# Patient Record
Sex: MALE | Race: WHITE | NOT HISPANIC OR LATINO
[De-identification: names, ages, dates, MRNs, and addresses within clinical notes are randomized per-mention and may not be internally consistent; named-entity substitution may affect disease eponyms.]

---

## 2017-12-05 PROBLEM — Z00.00 ENCOUNTER FOR PREVENTIVE HEALTH EXAMINATION: Status: ACTIVE | Noted: 2017-12-05

## 2017-12-14 ENCOUNTER — APPOINTMENT (OUTPATIENT)
Dept: ORTHOPEDIC SURGERY | Facility: CLINIC | Age: 57
End: 2017-12-14
Payer: OTHER MISCELLANEOUS

## 2017-12-14 VITALS
HEIGHT: 74 IN | WEIGHT: 250 LBS | HEART RATE: 85 BPM | SYSTOLIC BLOOD PRESSURE: 149 MMHG | DIASTOLIC BLOOD PRESSURE: 82 MMHG | BODY MASS INDEX: 32.08 KG/M2

## 2017-12-14 PROCEDURE — 72100 X-RAY EXAM L-S SPINE 2/3 VWS: CPT

## 2017-12-14 PROCEDURE — 72040 X-RAY EXAM NECK SPINE 2-3 VW: CPT

## 2017-12-14 PROCEDURE — 99205 OFFICE O/P NEW HI 60 MIN: CPT

## 2017-12-14 RX ORDER — OXYCODONE AND ACETAMINOPHEN 7.5; 325 MG/1; MG/1
7.5-325 TABLET ORAL
Qty: 40 | Refills: 0 | Status: ACTIVE | COMMUNITY
Start: 2017-06-22

## 2017-12-14 RX ORDER — AMOXICILLIN AND CLAVULANATE POTASSIUM 875; 125 MG/1; MG/1
875-125 TABLET, COATED ORAL
Qty: 14 | Refills: 0 | Status: ACTIVE | COMMUNITY
Start: 2017-09-20

## 2017-12-14 RX ORDER — FLUTICASONE PROPIONATE 50 UG/1
50 SPRAY, METERED NASAL
Qty: 16 | Refills: 0 | Status: ACTIVE | COMMUNITY
Start: 2017-09-20

## 2018-01-18 ENCOUNTER — OUTPATIENT (OUTPATIENT)
Dept: OUTPATIENT SERVICES | Facility: HOSPITAL | Age: 58
LOS: 1 days | End: 2018-01-18
Payer: COMMERCIAL

## 2018-01-18 VITALS
HEART RATE: 83 BPM | WEIGHT: 251.99 LBS | HEIGHT: 74 IN | OXYGEN SATURATION: 98 % | TEMPERATURE: 98 F | SYSTOLIC BLOOD PRESSURE: 152 MMHG | RESPIRATION RATE: 16 BRPM | DIASTOLIC BLOOD PRESSURE: 81 MMHG

## 2018-01-18 DIAGNOSIS — Z98.890 OTHER SPECIFIED POSTPROCEDURAL STATES: Chronic | ICD-10-CM

## 2018-01-18 DIAGNOSIS — M96.0 PSEUDARTHROSIS AFTER FUSION OR ARTHRODESIS: ICD-10-CM

## 2018-01-18 DIAGNOSIS — Z01.818 ENCOUNTER FOR OTHER PREPROCEDURAL EXAMINATION: ICD-10-CM

## 2018-01-18 DIAGNOSIS — M43.22 FUSION OF SPINE, CERVICAL REGION: Chronic | ICD-10-CM

## 2018-01-18 DIAGNOSIS — M54.2 CERVICALGIA: ICD-10-CM

## 2018-01-18 LAB
ANION GAP SERPL CALC-SCNC: 28 MMOL/L — HIGH (ref 5–17)
BLD GP AB SCN SERPL QL: NEGATIVE — SIGNIFICANT CHANGE UP
BUN SERPL-MCNC: 17 MG/DL — SIGNIFICANT CHANGE UP (ref 7–23)
CALCIUM SERPL-MCNC: 10.9 MG/DL — HIGH (ref 8.4–10.5)
CHLORIDE SERPL-SCNC: 104 MMOL/L — SIGNIFICANT CHANGE UP (ref 96–108)
CO2 SERPL-SCNC: 16 MMOL/L — LOW (ref 22–31)
CREAT SERPL-MCNC: 1.1 MG/DL — SIGNIFICANT CHANGE UP (ref 0.5–1.3)
GLUCOSE SERPL-MCNC: 104 MG/DL — HIGH (ref 70–99)
HCT VFR BLD CALC: 42.1 % — SIGNIFICANT CHANGE UP (ref 39–50)
HGB BLD-MCNC: 14.5 G/DL — SIGNIFICANT CHANGE UP (ref 13–17)
MCHC RBC-ENTMCNC: 31.7 PG — SIGNIFICANT CHANGE UP (ref 27–34)
MCHC RBC-ENTMCNC: 34.4 GM/DL — SIGNIFICANT CHANGE UP (ref 32–36)
MCV RBC AUTO: 92.1 FL — SIGNIFICANT CHANGE UP (ref 80–100)
PLATELET # BLD AUTO: 171 K/UL — SIGNIFICANT CHANGE UP (ref 150–400)
POTASSIUM SERPL-MCNC: 4.5 MMOL/L — SIGNIFICANT CHANGE UP (ref 3.5–5.3)
POTASSIUM SERPL-SCNC: 4.5 MMOL/L — SIGNIFICANT CHANGE UP (ref 3.5–5.3)
RBC # BLD: 4.57 M/UL — SIGNIFICANT CHANGE UP (ref 4.2–5.8)
RBC # FLD: 12.9 % — SIGNIFICANT CHANGE UP (ref 10.3–14.5)
RH IG SCN BLD-IMP: POSITIVE — SIGNIFICANT CHANGE UP
SODIUM SERPL-SCNC: 148 MMOL/L — HIGH (ref 135–145)
WBC # BLD: 8.39 K/UL — SIGNIFICANT CHANGE UP (ref 3.8–10.5)
WBC # FLD AUTO: 8.39 K/UL — SIGNIFICANT CHANGE UP (ref 3.8–10.5)

## 2018-01-18 PROCEDURE — 86900 BLOOD TYPING SEROLOGIC ABO: CPT

## 2018-01-18 PROCEDURE — 86901 BLOOD TYPING SEROLOGIC RH(D): CPT

## 2018-01-18 PROCEDURE — 85027 COMPLETE CBC AUTOMATED: CPT

## 2018-01-18 PROCEDURE — 93010 ELECTROCARDIOGRAM REPORT: CPT

## 2018-01-18 PROCEDURE — 87640 STAPH A DNA AMP PROBE: CPT

## 2018-01-18 PROCEDURE — 80048 BASIC METABOLIC PNL TOTAL CA: CPT

## 2018-01-18 PROCEDURE — 93005 ELECTROCARDIOGRAM TRACING: CPT

## 2018-01-18 PROCEDURE — G0463: CPT

## 2018-01-18 PROCEDURE — 86850 RBC ANTIBODY SCREEN: CPT

## 2018-01-18 PROCEDURE — 87641 MR-STAPH DNA AMP PROBE: CPT

## 2018-01-18 NOTE — H&P PST ADULT - PROBLEM SELECTOR PLAN 1
C4-C7 posterior spinal fusion  MRSA/MSSA culture collected C4-C7 posterior spinal fusion  MRSA/MSSA culture collected  EKG done prolonged surgery

## 2018-01-18 NOTE — H&P PST ADULT - PSH
Cervical vertebral fusion  2014  H/O shoulder surgery  right 2013 left 2004  S/p bilateral carpal tunnel release  2011 2012
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
## 2018-01-18 NOTE — H&P PST ADULT - NSANTHOSAYNRD_GEN_A_CORE
No. MARGUERITE screening performed.  STOP BANG Legend: 0-2 = LOW Risk; 3-4 = INTERMEDIATE Risk; 5-8 = HIGH Risk
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
## 2018-01-19 LAB
MRSA PCR RESULT.: SIGNIFICANT CHANGE UP
S AUREUS DNA NOSE QL NAA+PROBE: SIGNIFICANT CHANGE UP

## 2018-01-24 ENCOUNTER — APPOINTMENT (OUTPATIENT)
Dept: ORTHOPEDIC SURGERY | Facility: HOSPITAL | Age: 58
End: 2018-01-24

## 2018-01-24 ENCOUNTER — INPATIENT (INPATIENT)
Facility: HOSPITAL | Age: 58
LOS: 2 days | Discharge: HOME | DRG: 473 | End: 2018-01-27
Attending: ORTHOPAEDIC SURGERY | Admitting: ORTHOPAEDIC SURGERY
Payer: COMMERCIAL

## 2018-01-24 ENCOUNTER — TRANSCRIPTION ENCOUNTER (OUTPATIENT)
Age: 58
End: 2018-01-24

## 2018-01-24 VITALS
TEMPERATURE: 98 F | HEART RATE: 81 BPM | OXYGEN SATURATION: 96 % | DIASTOLIC BLOOD PRESSURE: 89 MMHG | SYSTOLIC BLOOD PRESSURE: 147 MMHG | RESPIRATION RATE: 20 BRPM

## 2018-01-24 DIAGNOSIS — M43.22 FUSION OF SPINE, CERVICAL REGION: Chronic | ICD-10-CM

## 2018-01-24 DIAGNOSIS — Z98.890 OTHER SPECIFIED POSTPROCEDURAL STATES: Chronic | ICD-10-CM

## 2018-01-24 DIAGNOSIS — M96.0 PSEUDARTHROSIS AFTER FUSION OR ARTHRODESIS: ICD-10-CM

## 2018-01-24 LAB — RH IG SCN BLD-IMP: POSITIVE — SIGNIFICANT CHANGE UP

## 2018-01-24 PROCEDURE — 22210 INCIS 1 VERTEBRAL SEG CERV: CPT | Mod: AS

## 2018-01-24 PROCEDURE — 61783 SCAN PROC SPINAL: CPT | Mod: AS

## 2018-01-24 PROCEDURE — 22600 ARTHRD PST TQ 1NTRSPC CRV: CPT | Mod: AS

## 2018-01-24 PROCEDURE — 22600 ARTHRD PST TQ 1NTRSPC CRV: CPT

## 2018-01-24 PROCEDURE — 22216 INCIS ADDL SPINE SEGMENT: CPT

## 2018-01-24 PROCEDURE — 61783 SCAN PROC SPINAL: CPT

## 2018-01-24 PROCEDURE — 22842 INSERT SPINE FIXATION DEVICE: CPT | Mod: AS

## 2018-01-24 PROCEDURE — 22216 INCIS ADDL SPINE SEGMENT: CPT | Mod: AS

## 2018-01-24 PROCEDURE — 22210 INCIS 1 VERTEBRAL SEG CERV: CPT

## 2018-01-24 PROCEDURE — 22614 ARTHRD PST TQ 1NTRSPC EA ADD: CPT

## 2018-01-24 PROCEDURE — 22614 ARTHRD PST TQ 1NTRSPC EA ADD: CPT | Mod: AS

## 2018-01-24 PROCEDURE — 22842 INSERT SPINE FIXATION DEVICE: CPT

## 2018-01-24 RX ORDER — ONDANSETRON 8 MG/1
4 TABLET, FILM COATED ORAL ONCE
Qty: 0 | Refills: 0 | Status: DISCONTINUED | OUTPATIENT
Start: 2018-01-24 | End: 2018-01-25

## 2018-01-24 RX ORDER — ONDANSETRON 8 MG/1
4 TABLET, FILM COATED ORAL EVERY 6 HOURS
Qty: 0 | Refills: 0 | Status: DISCONTINUED | OUTPATIENT
Start: 2018-01-24 | End: 2018-01-26

## 2018-01-24 RX ORDER — CEFAZOLIN SODIUM 1 G
2000 VIAL (EA) INJECTION ONCE
Qty: 0 | Refills: 0 | Status: COMPLETED | OUTPATIENT
Start: 2018-01-24 | End: 2018-01-24

## 2018-01-24 RX ORDER — HYDROMORPHONE HYDROCHLORIDE 2 MG/ML
30 INJECTION INTRAMUSCULAR; INTRAVENOUS; SUBCUTANEOUS
Qty: 0 | Refills: 0 | Status: DISCONTINUED | OUTPATIENT
Start: 2018-01-24 | End: 2018-01-26

## 2018-01-24 RX ORDER — ACETAMINOPHEN 500 MG
650 TABLET ORAL EVERY 6 HOURS
Qty: 0 | Refills: 0 | Status: DISCONTINUED | OUTPATIENT
Start: 2018-01-24 | End: 2018-01-27

## 2018-01-24 RX ORDER — DEXAMETHASONE 0.5 MG/5ML
3 ELIXIR ORAL EVERY 6 HOURS
Qty: 0 | Refills: 0 | Status: COMPLETED | OUTPATIENT
Start: 2018-01-25 | End: 2018-01-26

## 2018-01-24 RX ORDER — CEFAZOLIN SODIUM 1 G
2000 VIAL (EA) INJECTION EVERY 8 HOURS
Qty: 0 | Refills: 0 | Status: COMPLETED | OUTPATIENT
Start: 2018-01-24 | End: 2018-01-25

## 2018-01-24 RX ORDER — SODIUM CHLORIDE 9 MG/ML
3 INJECTION INTRAMUSCULAR; INTRAVENOUS; SUBCUTANEOUS EVERY 8 HOURS
Qty: 0 | Refills: 0 | Status: DISCONTINUED | OUTPATIENT
Start: 2018-01-24 | End: 2018-01-24

## 2018-01-24 RX ORDER — ONDANSETRON 8 MG/1
4 TABLET, FILM COATED ORAL EVERY 6 HOURS
Qty: 0 | Refills: 0 | Status: DISCONTINUED | OUTPATIENT
Start: 2018-01-24 | End: 2018-01-27

## 2018-01-24 RX ORDER — ACETAMINOPHEN 500 MG
1000 TABLET ORAL ONCE
Qty: 0 | Refills: 0 | Status: COMPLETED | OUTPATIENT
Start: 2018-01-24 | End: 2018-01-24

## 2018-01-24 RX ORDER — DEXAMETHASONE 0.5 MG/5ML
5 ELIXIR ORAL EVERY 6 HOURS
Qty: 0 | Refills: 0 | Status: COMPLETED | OUTPATIENT
Start: 2018-01-24 | End: 2018-01-25

## 2018-01-24 RX ORDER — NALOXONE HYDROCHLORIDE 4 MG/.1ML
0.1 SPRAY NASAL
Qty: 0 | Refills: 0 | Status: DISCONTINUED | OUTPATIENT
Start: 2018-01-24 | End: 2018-01-26

## 2018-01-24 RX ORDER — DOCUSATE SODIUM 100 MG
100 CAPSULE ORAL THREE TIMES A DAY
Qty: 0 | Refills: 0 | Status: DISCONTINUED | OUTPATIENT
Start: 2018-01-24 | End: 2018-01-27

## 2018-01-24 RX ORDER — DEXAMETHASONE 0.5 MG/5ML
1 ELIXIR ORAL EVERY 6 HOURS
Qty: 0 | Refills: 0 | Status: DISCONTINUED | OUTPATIENT
Start: 2018-01-26 | End: 2018-01-27

## 2018-01-24 RX ORDER — MAGNESIUM HYDROXIDE 400 MG/1
30 TABLET, CHEWABLE ORAL EVERY 12 HOURS
Qty: 0 | Refills: 0 | Status: DISCONTINUED | OUTPATIENT
Start: 2018-01-24 | End: 2018-01-27

## 2018-01-24 RX ORDER — HYDROMORPHONE HYDROCHLORIDE 2 MG/ML
0.5 INJECTION INTRAMUSCULAR; INTRAVENOUS; SUBCUTANEOUS
Qty: 0 | Refills: 0 | Status: DISCONTINUED | OUTPATIENT
Start: 2018-01-24 | End: 2018-01-26

## 2018-01-24 RX ORDER — SODIUM CHLORIDE 9 MG/ML
1000 INJECTION, SOLUTION INTRAVENOUS
Qty: 0 | Refills: 0 | Status: DISCONTINUED | OUTPATIENT
Start: 2018-01-24 | End: 2018-01-27

## 2018-01-24 RX ORDER — DEXAMETHASONE 0.5 MG/5ML
ELIXIR ORAL
Qty: 0 | Refills: 0 | Status: DISCONTINUED | OUTPATIENT
Start: 2018-01-24 | End: 2018-01-27

## 2018-01-24 RX ORDER — SENNA PLUS 8.6 MG/1
2 TABLET ORAL AT BEDTIME
Qty: 0 | Refills: 0 | Status: DISCONTINUED | OUTPATIENT
Start: 2018-01-24 | End: 2018-01-27

## 2018-01-24 RX ORDER — HYDROMORPHONE HYDROCHLORIDE 2 MG/ML
0.5 INJECTION INTRAMUSCULAR; INTRAVENOUS; SUBCUTANEOUS
Qty: 0 | Refills: 0 | Status: DISCONTINUED | OUTPATIENT
Start: 2018-01-24 | End: 2018-01-25

## 2018-01-24 RX ADMIN — SODIUM CHLORIDE 125 MILLILITER(S): 9 INJECTION, SOLUTION INTRAVENOUS at 18:36

## 2018-01-24 RX ADMIN — SENNA PLUS 2 TABLET(S): 8.6 TABLET ORAL at 21:10

## 2018-01-24 RX ADMIN — Medication 400 MILLIGRAM(S): at 18:51

## 2018-01-24 RX ADMIN — HYDROMORPHONE HYDROCHLORIDE 30 MILLILITER(S): 2 INJECTION INTRAMUSCULAR; INTRAVENOUS; SUBCUTANEOUS at 23:12

## 2018-01-24 RX ADMIN — Medication 5 MILLIGRAM(S): at 23:23

## 2018-01-24 RX ADMIN — Medication 100 MILLIGRAM(S): at 21:09

## 2018-01-24 RX ADMIN — Medication 1000 MILLIGRAM(S): at 18:54

## 2018-01-24 RX ADMIN — HYDROMORPHONE HYDROCHLORIDE 30 MILLILITER(S): 2 INJECTION INTRAMUSCULAR; INTRAVENOUS; SUBCUTANEOUS at 19:26

## 2018-01-24 RX ADMIN — Medication 5 MILLIGRAM(S): at 18:52

## 2018-01-24 RX ADMIN — Medication 100 MILLIGRAM(S): at 21:10

## 2018-01-24 RX ADMIN — SODIUM CHLORIDE 125 MILLILITER(S): 9 INJECTION, SOLUTION INTRAVENOUS at 18:33

## 2018-01-24 RX ADMIN — HYDROMORPHONE HYDROCHLORIDE 30 MILLILITER(S): 2 INJECTION INTRAMUSCULAR; INTRAVENOUS; SUBCUTANEOUS at 22:03

## 2018-01-24 NOTE — CHART NOTE - NSCHARTNOTEFT_GEN_A_CORE
Patient Resting without complaints.  Denies Chest Pain, SOB, N/V or Dysphagia.    T(C): 36.1 (01-24-18 @ 17:50)  T(F): 97 (01-24-18 @ 17:50)  HR: 77 (01-24-18 @ 18:45)  BP: 137/71 (01-24-18 @ 18:45)  RR: 16 (01-24-18 @ 18:45)  SpO2: 98% (01-24-18 @ 18:45)      Exam:   Gen: NAD; Alert/Oriented    Cervical: Dsg CDI; Aspen collar on    Sensation/Motor grossly intact           AIN/PIN/R/U/M nerves grossly intact;  strength 5/5       A/P :  57y Male s/p C5-C7 PSF; Stable  - Taper  - Pain control  - DVT ppx w/SCDs; IS     - AM labs    - PT eval- WBAT in C-collar  - FU Aspen collar  - Cont current tx    Vianney Panda PA-C  Orthopedic Surgery  Pagers [phone]

## 2018-01-24 NOTE — BRIEF OPERATIVE NOTE - PROCEDURE
<<-----Click on this checkbox to enter Procedure Posterior spinal fusion  01/24/2018  C5-C7 PSF  Active  MEMO

## 2018-01-25 LAB
ANION GAP SERPL CALC-SCNC: 13 MMOL/L — SIGNIFICANT CHANGE UP (ref 5–17)
ANION GAP SERPL CALC-SCNC: 13 MMOL/L — SIGNIFICANT CHANGE UP (ref 5–17)
BASOPHILS # BLD AUTO: 0 K/UL — SIGNIFICANT CHANGE UP (ref 0–0.2)
BASOPHILS # BLD AUTO: 0 K/UL — SIGNIFICANT CHANGE UP (ref 0–0.2)
BASOPHILS NFR BLD AUTO: 0 % — SIGNIFICANT CHANGE UP (ref 0–2)
BASOPHILS NFR BLD AUTO: 0.4 % — SIGNIFICANT CHANGE UP (ref 0–2)
BUN SERPL-MCNC: 13 MG/DL — SIGNIFICANT CHANGE UP (ref 7–23)
BUN SERPL-MCNC: 15 MG/DL — SIGNIFICANT CHANGE UP (ref 7–23)
CALCIUM SERPL-MCNC: 9.1 MG/DL — SIGNIFICANT CHANGE UP (ref 8.4–10.5)
CALCIUM SERPL-MCNC: 9.2 MG/DL — SIGNIFICANT CHANGE UP (ref 8.4–10.5)
CHLORIDE SERPL-SCNC: 97 MMOL/L — SIGNIFICANT CHANGE UP (ref 96–108)
CHLORIDE SERPL-SCNC: 99 MMOL/L — SIGNIFICANT CHANGE UP (ref 96–108)
CO2 SERPL-SCNC: 24 MMOL/L — SIGNIFICANT CHANGE UP (ref 22–31)
CO2 SERPL-SCNC: 24 MMOL/L — SIGNIFICANT CHANGE UP (ref 22–31)
CREAT SERPL-MCNC: 0.76 MG/DL — SIGNIFICANT CHANGE UP (ref 0.5–1.3)
CREAT SERPL-MCNC: 0.86 MG/DL — SIGNIFICANT CHANGE UP (ref 0.5–1.3)
EOSINOPHIL # BLD AUTO: 0 K/UL — SIGNIFICANT CHANGE UP (ref 0–0.5)
EOSINOPHIL # BLD AUTO: 0 K/UL — SIGNIFICANT CHANGE UP (ref 0–0.5)
EOSINOPHIL NFR BLD AUTO: 0 % — SIGNIFICANT CHANGE UP (ref 0–6)
EOSINOPHIL NFR BLD AUTO: 0.2 % — SIGNIFICANT CHANGE UP (ref 0–6)
GLUCOSE SERPL-MCNC: 169 MG/DL — HIGH (ref 70–99)
GLUCOSE SERPL-MCNC: 204 MG/DL — HIGH (ref 70–99)
HCT VFR BLD CALC: 40.2 % — SIGNIFICANT CHANGE UP (ref 39–50)
HCT VFR BLD CALC: 40.7 % — SIGNIFICANT CHANGE UP (ref 39–50)
HGB BLD-MCNC: 14.7 G/DL — SIGNIFICANT CHANGE UP (ref 13–17)
HGB BLD-MCNC: 14.7 G/DL — SIGNIFICANT CHANGE UP (ref 13–17)
LYMPHOCYTES # BLD AUTO: 0.8 K/UL — LOW (ref 1–3.3)
LYMPHOCYTES # BLD AUTO: 0.9 K/UL — LOW (ref 1–3.3)
LYMPHOCYTES # BLD AUTO: 9.4 % — LOW (ref 13–44)
LYMPHOCYTES # BLD AUTO: 9.9 % — LOW (ref 13–44)
MCHC RBC-ENTMCNC: 33.7 PG — SIGNIFICANT CHANGE UP (ref 27–34)
MCHC RBC-ENTMCNC: 34 PG — SIGNIFICANT CHANGE UP (ref 27–34)
MCHC RBC-ENTMCNC: 36.2 GM/DL — HIGH (ref 32–36)
MCHC RBC-ENTMCNC: 36.6 GM/DL — HIGH (ref 32–36)
MCV RBC AUTO: 92.9 FL — SIGNIFICANT CHANGE UP (ref 80–100)
MCV RBC AUTO: 93.1 FL — SIGNIFICANT CHANGE UP (ref 80–100)
MONOCYTES # BLD AUTO: 0.2 K/UL — SIGNIFICANT CHANGE UP (ref 0–0.9)
MONOCYTES # BLD AUTO: 0.5 K/UL — SIGNIFICANT CHANGE UP (ref 0–0.9)
MONOCYTES NFR BLD AUTO: 3 % — SIGNIFICANT CHANGE UP (ref 2–14)
MONOCYTES NFR BLD AUTO: 5 % — SIGNIFICANT CHANGE UP (ref 2–14)
NEUTROPHILS # BLD AUTO: 7 K/UL — SIGNIFICANT CHANGE UP (ref 1.8–7.4)
NEUTROPHILS # BLD AUTO: 8.2 K/UL — HIGH (ref 1.8–7.4)
NEUTROPHILS NFR BLD AUTO: 85.2 % — HIGH (ref 43–77)
NEUTROPHILS NFR BLD AUTO: 87 % — HIGH (ref 43–77)
PLATELET # BLD AUTO: 122 K/UL — LOW (ref 150–400)
PLATELET # BLD AUTO: 126 K/UL — LOW (ref 150–400)
POTASSIUM SERPL-MCNC: 4.2 MMOL/L — SIGNIFICANT CHANGE UP (ref 3.5–5.3)
POTASSIUM SERPL-MCNC: 4.4 MMOL/L — SIGNIFICANT CHANGE UP (ref 3.5–5.3)
POTASSIUM SERPL-SCNC: 4.2 MMOL/L — SIGNIFICANT CHANGE UP (ref 3.5–5.3)
POTASSIUM SERPL-SCNC: 4.4 MMOL/L — SIGNIFICANT CHANGE UP (ref 3.5–5.3)
RBC # BLD: 4.33 M/UL — SIGNIFICANT CHANGE UP (ref 4.2–5.8)
RBC # BLD: 4.37 M/UL — SIGNIFICANT CHANGE UP (ref 4.2–5.8)
RBC # FLD: 11.2 % — SIGNIFICANT CHANGE UP (ref 10.3–14.5)
RBC # FLD: 11.3 % — SIGNIFICANT CHANGE UP (ref 10.3–14.5)
SODIUM SERPL-SCNC: 135 MMOL/L — SIGNIFICANT CHANGE UP (ref 135–145)
SODIUM SERPL-SCNC: 136 MMOL/L — SIGNIFICANT CHANGE UP (ref 135–145)
WBC # BLD: 8.1 K/UL — SIGNIFICANT CHANGE UP (ref 3.8–10.5)
WBC # BLD: 9.6 K/UL — SIGNIFICANT CHANGE UP (ref 3.8–10.5)
WBC # FLD AUTO: 8.1 K/UL — SIGNIFICANT CHANGE UP (ref 3.8–10.5)
WBC # FLD AUTO: 9.6 K/UL — SIGNIFICANT CHANGE UP (ref 3.8–10.5)

## 2018-01-25 RX ADMIN — Medication 3 MILLIGRAM(S): at 23:56

## 2018-01-25 RX ADMIN — Medication 100 MILLIGRAM(S): at 12:59

## 2018-01-25 RX ADMIN — HYDROMORPHONE HYDROCHLORIDE 30 MILLILITER(S): 2 INJECTION INTRAMUSCULAR; INTRAVENOUS; SUBCUTANEOUS at 12:23

## 2018-01-25 RX ADMIN — Medication 3 MILLIGRAM(S): at 17:50

## 2018-01-25 RX ADMIN — Medication 5 MILLIGRAM(S): at 05:13

## 2018-01-25 RX ADMIN — HYDROMORPHONE HYDROCHLORIDE 30 MILLILITER(S): 2 INJECTION INTRAMUSCULAR; INTRAVENOUS; SUBCUTANEOUS at 05:28

## 2018-01-25 RX ADMIN — Medication 5 MILLIGRAM(S): at 12:56

## 2018-01-25 RX ADMIN — HYDROMORPHONE HYDROCHLORIDE 30 MILLILITER(S): 2 INJECTION INTRAMUSCULAR; INTRAVENOUS; SUBCUTANEOUS at 16:45

## 2018-01-25 RX ADMIN — Medication 100 MILLIGRAM(S): at 21:08

## 2018-01-25 RX ADMIN — SENNA PLUS 2 TABLET(S): 8.6 TABLET ORAL at 21:08

## 2018-01-25 RX ADMIN — HYDROMORPHONE HYDROCHLORIDE 30 MILLILITER(S): 2 INJECTION INTRAMUSCULAR; INTRAVENOUS; SUBCUTANEOUS at 23:04

## 2018-01-25 RX ADMIN — HYDROMORPHONE HYDROCHLORIDE 30 MILLILITER(S): 2 INJECTION INTRAMUSCULAR; INTRAVENOUS; SUBCUTANEOUS at 19:24

## 2018-01-25 RX ADMIN — HYDROMORPHONE HYDROCHLORIDE 30 MILLILITER(S): 2 INJECTION INTRAMUSCULAR; INTRAVENOUS; SUBCUTANEOUS at 07:01

## 2018-01-25 RX ADMIN — Medication 100 MILLIGRAM(S): at 03:18

## 2018-01-25 NOTE — PROGRESS NOTE ADULT - SUBJECTIVE AND OBJECTIVE BOX
Pt S/E at bedside, no acute events overnight, pain controlled                          14.7   8.1   )-----------( 122      ( 25 Jan 2018 00:44 )             40.2     25 Jan 2018 00:44    136    |  99     |  15     ----------------------------<  204    4.4     |  24     |  0.86     Ca    9.2        25 Jan 2018 00:44        Vital Signs Last 24 Hrs  T(C): 36.4 (01-25-18 @ 06:24), Max: 36.8 (01-24-18 @ 20:30)  T(F): 97.5 (01-25-18 @ 06:24), Max: 98.2 (01-24-18 @ 20:30)  HR: 64 (01-25-18 @ 06:24) (62 - 82)  BP: 147/86 (01-25-18 @ 06:24) (111/67 - 150/73)  BP(mean): 94 (01-25-18 @ 05:30) (82 - 106)  RR: 16 (01-25-18 @ 06:24) (12 - 20)  SpO2: 94% (01-25-18 @ 06:24) (93% - 100%)    Gen: NAD,     Spine:  Dressing clean dry intact  +EHL/FHL/TA/GS  +AIN/PIN/M/R/U/Msc/Ax  SILT L3-S1  SILT C5-T1  +DP/PT Pulses  +Radial Pulse  Compartments soft  No calf TTP B/L Pt S/E at bedside, no acute events overnight, pain controlled                          14.7   8.1   )-----------( 122      ( 25 Jan 2018 00:44 )             40.2     25 Jan 2018 00:44    136    |  99     |  15     ----------------------------<  204    4.4     |  24     |  0.86     Ca    9.2        25 Jan 2018 00:44        Vital Signs Last 24 Hrs  T(C): 36.4 (01-25-18 @ 06:24), Max: 36.8 (01-24-18 @ 20:30)  T(F): 97.5 (01-25-18 @ 06:24), Max: 98.2 (01-24-18 @ 20:30)  HR: 64 (01-25-18 @ 06:24) (62 - 82)  BP: 147/86 (01-25-18 @ 06:24) (111/67 - 150/73)  BP(mean): 94 (01-25-18 @ 05:30) (82 - 106)  RR: 16 (01-25-18 @ 06:24) (12 - 20)  SpO2: 94% (01-25-18 @ 06:24) (93% - 100%)    Gen: NAD,     Spine:  Dressing clean dry intact  In [place] J collar  +EHL/FHL/TA/GS  +AIN/PIN/M/R/U/Msc/Ax  SILT L3-S1  SILT C5-T1  +DP/PT Pulses  +Radial Pulse  Compartments soft  No calf TTP B/L

## 2018-01-25 NOTE — PHYSICAL THERAPY INITIAL EVALUATION ADULT - DIAGNOSIS, PT EVAL
Impaired mobility/function secondary to generalized weakness, decreased balance, decreased activity tolerance.

## 2018-01-25 NOTE — PROGRESS NOTE ADULT - SUBJECTIVE AND OBJECTIVE BOX
Day _1_ of Anesthesia Pain Management Service    SUBJECTIVE: Patient is doing well with IV PCA    Pain Scale Score:	[X] Refer to charted pain scores    THERAPY:    [ ] IV PCA Morphine		[ ] 5 mg/mL	[ ] 1 mg/mL  [X] IV PCA Hydromorphone	[ ] 5 mg/mL	[X] 1 mg/mL  [ ] IV PCA Fentanyl		[ ] 50 micrograms/mL    Demand dose: 0.2 mg     Lockout: 6 minutes   Continuous Rate: 0 mg/hr  4 Hour Limit: 4 mg    MEDICATIONS  (STANDING):  dexamethasone  Injectable   IV Push   dexamethasone  Injectable 5 milliGRAM(s) IV Push every 6 hours  dexamethasone  Injectable 3 milliGRAM(s) IV Push every 6 hours  docusate sodium 100 milliGRAM(s) Oral three times a day  HYDROmorphone PCA (1 mG/mL) 30 milliLiter(s) PCA Continuous PCA Continuous  lactated ringers. 1000 milliLiter(s) (125 mL/Hr) IV Continuous <Continuous>  senna 2 Tablet(s) Oral at bedtime    MEDICATIONS  (PRN):  acetaminophen   Tablet 650 milliGRAM(s) Oral every 6 hours PRN For Temp greater than 38 C (100.4 F)  acetaminophen   Tablet. 650 milliGRAM(s) Oral every 6 hours PRN Mild Pain (1 - 3) or HA  diazepam    Tablet 5 milliGRAM(s) Oral every 12 hours PRN Anxiety  HYDROmorphone PCA (1 mG/mL) Rescue Clinician Bol[place] 0.5 milliGRAM(s) IV Push every 15 minutes PRN for Pain Scale GREATER THAN 6  magnesium hydroxide Suspension 30 milliLiter(s) Oral every 12 hours PRN Constipation  naloxone Injectable 0.1 milliGRAM(s) IV Push every 3 minutes PRN For ANY of the following changes in patient status:  A. RR LESS THAN 10 breaths per minute, B. Oxygen saturation LESS THAN 90%, C. Sedation score of 6  ondansetron Injectable 4 milliGRAM(s) IV Push every 6 hours PRN Nausea  ondansetron Injectable 4 milliGRAM(s) IV Push every 6 hours PRN Nausea      OBJECTIVE:    Sedation Score:	[ X] Alert	[ ] Drowsy 	[ ] Arousable	[ ] Asleep	[ ] Unresponsive    Side Effects:	[X ] None	[ ] Nausea	[ ] Vomiting	[ ] Pruritus  		[ ] Other:    Vital Signs Last 24 Hrs  T(C): 36.7 (25 Jan 2018 09:30), Max: 36.8 (24 Jan 2018 20:30)  T(F): 98 (25 Jan 2018 09:30), Max: 98.2 (24 Jan 2018 20:30)  HR: 75 (25 Jan 2018 09:34) (62 - 82)  BP: 163/83 (25 Jan 2018 09:34) (111/67 - 163/83)  BP(mean): 94 (25 Jan 2018 05:30) (82 - 106)  RR: 18 (25 Jan 2018 09:30) (12 - 20)  SpO2: 95% (25 Jan 2018 09:30) (93% - 100%)    ASSESSMENT/ PLAN    Therapy to  be:               [X] Continued   [ ] Discontinued   [ ] Changed to PRN Analgesics    Documentation and Verification of current medications:   [X] Done	[ ] Not done, not eligible    Comments:

## 2018-01-25 NOTE — PHYSICAL THERAPY INITIAL EVALUATION ADULT - PLANNED THERAPY INTERVENTIONS, PT EVAL
balance training/bed mobility training/gait training/strengthening/transfer training/stair assessment/training

## 2018-01-25 NOTE — PROGRESS NOTE ADULT - SUBJECTIVE AND OBJECTIVE BOX
Pain Management Attending Addendum    SUBJECTIVE:    Therapy:	  [ X] IV PCA	   [ ] Epidural           [ ] s/p Spinal Opoid              [ ] Postpartum infusion	  [ ] Patient controlled regional anesthesia (PCRA)    [ ] prn Analgesics    OBJECTIVE:   [X ] No new signs     [ ] Other:    Side Effects:  [ X] None			[ ] Other:    Assessment of Catheter Site:		[ ] Intact		[ ] Other:n/a    ASSESSMENT/PLAN  [ X] Continue current therapy    [ ] Therapy changed to:    [ ] IV PCA       [ ] Epidural     [ ] prn Analgesics     [ ] post partum infusion    Comments:

## 2018-01-25 NOTE — PHYSICAL THERAPY INITIAL EVALUATION ADULT - IMPAIRMENTS CONTRIBUTING TO GAIT DEVIATIONS, PT EVAL
impaired balance/decreased strength/impaired postural control/Pt c/o feeling lightheaded post ambulating ~ 5 ft, c/o room spinning. Pt assisted to sit at EOB, and then from sit to supine with min assist. PT unable to obtain Bp secondary to pt reporting he needed to lay down immediately.

## 2018-01-25 NOTE — PROGRESS NOTE ADULT - ASSESSMENT
A/P: 57M s/p C4-C7 PSF POD 1  Pain Control  DVT ppx  WBAT, [place] J collar  PT/OT  Incentive Spirometry  DC planning A/P: 57M s/p C4-C7 PSF POD 1  Pain Control  DVT ppx  WBAT, [place] J collar, Follow up [place] collar  PT/OT  Incentive Spirometry  DC planning
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
## 2018-01-25 NOTE — PHYSICAL THERAPY INITIAL EVALUATION ADULT - GENERAL OBSERVATIONS, REHAB EVAL
Pt received OOB to chair A & O x 4. Pt with (+)PIV, (+)PCA, pt in NAD. Pt received OOB to chair A & O x 4. Pt with (+)PIV, (+)PCA, [place] J-collar in place, pt in NAD.

## 2018-01-26 ENCOUNTER — TRANSCRIPTION ENCOUNTER (OUTPATIENT)
Age: 58
End: 2018-01-26

## 2018-01-26 LAB
ANION GAP SERPL CALC-SCNC: 13 MMOL/L — SIGNIFICANT CHANGE UP (ref 5–17)
BASOPHILS # BLD AUTO: 0.02 K/UL — SIGNIFICANT CHANGE UP (ref 0–0.2)
BASOPHILS NFR BLD AUTO: 0.2 % — SIGNIFICANT CHANGE UP (ref 0–2)
BUN SERPL-MCNC: 14 MG/DL — SIGNIFICANT CHANGE UP (ref 7–23)
CALCIUM SERPL-MCNC: 8.8 MG/DL — SIGNIFICANT CHANGE UP (ref 8.4–10.5)
CHLORIDE SERPL-SCNC: 99 MMOL/L — SIGNIFICANT CHANGE UP (ref 96–108)
CO2 SERPL-SCNC: 26 MMOL/L — SIGNIFICANT CHANGE UP (ref 22–31)
CREAT SERPL-MCNC: 0.71 MG/DL — SIGNIFICANT CHANGE UP (ref 0.5–1.3)
EOSINOPHIL # BLD AUTO: 0.01 K/UL — SIGNIFICANT CHANGE UP (ref 0–0.5)
EOSINOPHIL NFR BLD AUTO: 0.1 % — SIGNIFICANT CHANGE UP (ref 0–6)
GLUCOSE SERPL-MCNC: 115 MG/DL — HIGH (ref 70–99)
HCT VFR BLD CALC: 38.2 % — LOW (ref 39–50)
HGB BLD-MCNC: 13.2 G/DL — SIGNIFICANT CHANGE UP (ref 13–17)
IMM GRANULOCYTES NFR BLD AUTO: 0.2 % — SIGNIFICANT CHANGE UP (ref 0–1.5)
LYMPHOCYTES # BLD AUTO: 16.7 % — SIGNIFICANT CHANGE UP (ref 13–44)
LYMPHOCYTES # BLD AUTO: 2.06 K/UL — SIGNIFICANT CHANGE UP (ref 1–3.3)
MCHC RBC-ENTMCNC: 32 PG — SIGNIFICANT CHANGE UP (ref 27–34)
MCHC RBC-ENTMCNC: 34.6 GM/DL — SIGNIFICANT CHANGE UP (ref 32–36)
MCV RBC AUTO: 92.5 FL — SIGNIFICANT CHANGE UP (ref 80–100)
MONOCYTES # BLD AUTO: 1.04 K/UL — HIGH (ref 0–0.9)
MONOCYTES NFR BLD AUTO: 8.4 % — SIGNIFICANT CHANGE UP (ref 2–14)
NEUTROPHILS # BLD AUTO: 9.17 K/UL — HIGH (ref 1.8–7.4)
NEUTROPHILS NFR BLD AUTO: 74.4 % — SIGNIFICANT CHANGE UP (ref 43–77)
PLATELET # BLD AUTO: 124 K/UL — LOW (ref 150–400)
POTASSIUM SERPL-MCNC: 4.1 MMOL/L — SIGNIFICANT CHANGE UP (ref 3.5–5.3)
POTASSIUM SERPL-SCNC: 4.1 MMOL/L — SIGNIFICANT CHANGE UP (ref 3.5–5.3)
RBC # BLD: 4.13 M/UL — LOW (ref 4.2–5.8)
RBC # FLD: 12.3 % — SIGNIFICANT CHANGE UP (ref 10.3–14.5)
SODIUM SERPL-SCNC: 138 MMOL/L — SIGNIFICANT CHANGE UP (ref 135–145)
WBC # BLD: 12.32 K/UL — HIGH (ref 3.8–10.5)
WBC # FLD AUTO: 12.32 K/UL — HIGH (ref 3.8–10.5)

## 2018-01-26 RX ORDER — ACETAMINOPHEN 500 MG
2 TABLET ORAL
Qty: 0 | Refills: 0 | COMMUNITY
Start: 2018-01-26

## 2018-01-26 RX ORDER — OMEGA-3 ACID ETHYL ESTERS 1 G
0 CAPSULE ORAL
Qty: 0 | Refills: 0 | COMMUNITY

## 2018-01-26 RX ORDER — OXYCODONE HYDROCHLORIDE 5 MG/1
5 TABLET ORAL EVERY 4 HOURS
Qty: 0 | Refills: 0 | Status: DISCONTINUED | OUTPATIENT
Start: 2018-01-26 | End: 2018-01-27

## 2018-01-26 RX ORDER — DOCUSATE SODIUM 100 MG
1 CAPSULE ORAL
Qty: 0 | Refills: 0 | COMMUNITY
Start: 2018-01-26

## 2018-01-26 RX ORDER — SENNA PLUS 8.6 MG/1
2 TABLET ORAL
Qty: 0 | Refills: 0 | COMMUNITY
Start: 2018-01-26

## 2018-01-26 RX ORDER — OXYCODONE HYDROCHLORIDE 5 MG/1
10 TABLET ORAL EVERY 4 HOURS
Qty: 0 | Refills: 0 | Status: DISCONTINUED | OUTPATIENT
Start: 2018-01-26 | End: 2018-01-27

## 2018-01-26 RX ADMIN — Medication 3 MILLIGRAM(S): at 13:35

## 2018-01-26 RX ADMIN — Medication 100 MILLIGRAM(S): at 05:17

## 2018-01-26 RX ADMIN — Medication 3 MILLIGRAM(S): at 06:01

## 2018-01-26 RX ADMIN — OXYCODONE HYDROCHLORIDE 10 MILLIGRAM(S): 5 TABLET ORAL at 20:18

## 2018-01-26 RX ADMIN — Medication 1 MILLIGRAM(S): at 18:03

## 2018-01-26 RX ADMIN — HYDROMORPHONE HYDROCHLORIDE 30 MILLILITER(S): 2 INJECTION INTRAMUSCULAR; INTRAVENOUS; SUBCUTANEOUS at 03:12

## 2018-01-26 RX ADMIN — OXYCODONE HYDROCHLORIDE 10 MILLIGRAM(S): 5 TABLET ORAL at 10:28

## 2018-01-26 RX ADMIN — OXYCODONE HYDROCHLORIDE 10 MILLIGRAM(S): 5 TABLET ORAL at 11:00

## 2018-01-26 RX ADMIN — OXYCODONE HYDROCHLORIDE 10 MILLIGRAM(S): 5 TABLET ORAL at 21:16

## 2018-01-26 RX ADMIN — HYDROMORPHONE HYDROCHLORIDE 30 MILLILITER(S): 2 INJECTION INTRAMUSCULAR; INTRAVENOUS; SUBCUTANEOUS at 07:07

## 2018-01-26 NOTE — DISCHARGE NOTE ADULT - CARE PLAN
Principal Discharge DX:	S/P lumbar fusion  Goal:	improved ADL's, pain control  Assessment and plan of treatment:	Please call Dr. Contreras's office on discharge from hospital to make follow up appointment for 7-10 days.  Cervical collar at all times, weight bearing as tolerated.  Complete your steroid taper as prescribed.  Keep dressing clean, dry, and intact. Principal Discharge DX:	S/P lumbar fusion  Goal:	improved ADL's, pain control  Assessment and plan of treatment:	Please call Dr. Contreras's office on discharge from hospital to make follow up appointment for 7-10 days.  Cervical collar at all times, weight bearing as tolerated.  Follow up Dr. Contreras' s instruction sheet

## 2018-01-26 NOTE — DISCHARGE NOTE ADULT - REASON FOR ADMISSION
neck pain-C4-C7 Posterior Spinal Fusion neck pain-C5-C7 Posterior Spinal Fusion Neck pain/ C5-C7 Posterior Spinal Fusion

## 2018-01-26 NOTE — DISCHARGE NOTE ADULT - CARE PROVIDER_API CALL
Momo Contreras (MD), Orthopedics  [address] [address]  Phone: [phone]  Fax: [phone]

## 2018-01-26 NOTE — PROGRESS NOTE ADULT - ASSESSMENT
A/P: 57M s/p C4-C7 PSF POD 2, stable, NVI  Pain Control  DVT ppx  WBAT, [place] J collar, Follow up [place] collar  PT/OT  Incentive Spirometry  DC planning

## 2018-01-26 NOTE — DISCHARGE NOTE ADULT - NS AS ACTIVITY OBS
Showering allowed/stairs/shower with assistance/Stairs allowed/Walking-Indoors allowed/Do not make important decisions/Do not drive or operate machinery/Walking-Outdoors allowed/No Heavy lifting/straining Walking-Indoors allowed/No Heavy lifting/straining/Stairs allowed/Walking-Outdoors allowed/Do not make important decisions/Do not drive or operate machinery

## 2018-01-26 NOTE — DIETITIAN INITIAL EVALUATION ADULT. - NS AS NUTRI INTERV ED CONTENT
Encouraged balanced meals with adequate protein for healing. Also encouraged adequate fiber and fluids to prevent constipation.

## 2018-01-26 NOTE — DIETITIAN INITIAL EVALUATION ADULT. - NS FNS WEIGHT CHANGE REASON
unintentional/Pt reports last March he had dental work and had decreased po intake and lost 18 pounds in about 3 months. Pt reports since then his wt has been stable around 250 pounds.

## 2018-01-26 NOTE — DISCHARGE NOTE ADULT - MEDICATION SUMMARY - MEDICATIONS TO TAKE
I will START or STAY ON the medications listed below when I get home from the hospital:    acetaminophen 325 mg oral tablet  -- 2 tab(s) by mouth every 6 hours, As needed, For Temp greater than 38 C (100.4 F)  -- Indication: For temp    acetaminophen 325 mg oral tablet  -- 2 tab(s) by mouth every 6 hours, As needed, Mild Pain (1 - 3) or HA  -- Indication: For Pain    oxyCODONE 5 mg oral tablet  -- 1 or 2 tab(s) by mouth every 4 hours, As needed, for Pain MDD:10  -- Indication: For Pain    senna oral tablet  -- 2 tab(s) by mouth once a day (at bedtime)  -- Indication: For Laxative    docusate sodium 100 mg oral capsule  -- 1 cap(s) by mouth 3 times a day  -- Indication: For Stool softener

## 2018-01-26 NOTE — DIETITIAN INITIAL EVALUATION ADULT. - ORAL INTAKE PTA
good/Pt reports he usually only has 2 meals per day: 6 egg whites with turkey or ham, mushrooms, spinach or broccoli with english muffin and sometimes with sausage; snacks on fruit and drinks a lot of vitamin water during the day; mostly chicken for dinner or turkey or regular burgers. Pt reports taking Omega 3 fatty acids, MVI, vitamin C, flax oil, Niacin, testosterone.
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
## 2018-01-26 NOTE — DISCHARGE NOTE ADULT - PATIENT PORTAL LINK FT
“You can access the [employer] Patient Portal, offered by [facility], by registering with the following website: [email or web address]”

## 2018-01-26 NOTE — PROGRESS NOTE ADULT - SUBJECTIVE AND OBJECTIVE BOX
Day 2_ of Anesthesia Pain Management Service    SUBJECTIVE: Patient is doing well with IV PCA    Pain Scale Score:	[X] Refer to charted pain scores    THERAPY:    [ ] IV PCA Morphine		[ ] 5 mg/mL	[ ] 1 mg/mL  [X] IV PCA Hydromorphone	[ ] 5 mg/mL	[X] 1 mg/mL  [ ] IV PCA Fentanyl		[ ] 50 micrograms/mL    Demand dose: 0.2 mg     Lockout: 6 minutes   Continuous Rate: 0 mg/hr  4 Hour Limit: 4 mg    MEDICATIONS  (STANDING):  dexamethasone  Injectable   IV Push   dexamethasone  Injectable 3 milliGRAM(s) IV Push every 6 hours  dexamethasone  Injectable 1 milliGRAM(s) IV Push every 6 hours  docusate sodium 100 milliGRAM(s) Oral three times a day  lactated ringers. 1000 milliLiter(s) (125 mL/Hr) IV Continuous <Continuous>  senna 2 Tablet(s) Oral at bedtime    MEDICATIONS  (PRN):  acetaminophen   Tablet 650 milliGRAM(s) Oral every 6 hours PRN For Temp greater than 38 C (100.4 F)  acetaminophen   Tablet. 650 milliGRAM(s) Oral every 6 hours PRN Mild Pain (1 - 3) or HA  diazepam    Tablet 5 milliGRAM(s) Oral every 12 hours PRN Anxiety  magnesium hydroxide Suspension 30 milliLiter(s) Oral every 12 hours PRN Constipation  ondansetron Injectable 4 milliGRAM(s) IV Push every 6 hours PRN Nausea  oxyCODONE    IR 5 milliGRAM(s) Oral every 4 hours PRN Moderate Pain (4 - 6)  oxyCODONE    IR 10 milliGRAM(s) Oral every 4 hours PRN Severe Pain (7 - 10)      OBJECTIVE:    Sedation Score:	[ X] Alert	[ ] Drowsy 	[ ] Arousable	[ ] Asleep	[ ] Unresponsive    Side Effects:	[X ] None	[ ] Nausea	[ ] Vomiting	[ ] Pruritus  		[ ] Other:    Vital Signs Last 24 Hrs  T(C): 36.8 (26 Jan 2018 08:38), Max: 36.8 (26 Jan 2018 08:38)  T(F): 98.3 (26 Jan 2018 08:38), Max: 98.3 (26 Jan 2018 08:38)  HR: 65 (26 Jan 2018 08:38) (65 - 77)  BP: 144/72 (26 Jan 2018 08:38) (135/69 - 155/83)  BP(mean): --  RR: 16 (26 Jan 2018 08:38) (16 - 18)  SpO2: 94% (26 Jan 2018 08:38) (93% - 94%)    ASSESSMENT/ PLAN    Therapy to  be:               [] Continued   [X] Discontinued   [ x] Changed to PRN Analgesics    Documentation and Verification of current medications:   [X] Done	[ ] Not done, not eligible    Comments:

## 2018-01-26 NOTE — DIETITIAN INITIAL EVALUATION ADULT. - OTHER INFO
Nutrition consult received and appreciated. Pt reports his appetite and po intake remains good. RD observed pt consumed 100% of breakfast; pt reports the portion sizes are too small, RD discussed the option of ordering double protein/entrees and reviewed menu options and menu ordering procedure. Pt also receives food from outside of the hospital. No GI distress at this time. Last BM was today. No chewing or swallowing difficulties. No known food allergies.

## 2018-01-26 NOTE — PROGRESS NOTE ADULT - SUBJECTIVE AND OBJECTIVE BOX
Ortho Progress Note    S: Patient seen and examined. No acute events overnight. Pain well controlled with current regimen. Denies lightheadedness/dizziness, CP/SOB. Tolerating diet.       O:  Physical Exam:  Gen: Laying in bed, NAD, alert and oriented.   Resp: Unlabored breathing  Ext: In [place] J collar  +EHL/FHL/TA/GS  +AIN/PIN/M/R/U/Msc/Ax  SILT L3-S1  SILT C5-T1  BUE WWP      Vital Signs Last 24 Hrs  T(C): 36.8 (26 Jan 2018 08:38), Max: 36.8 (26 Jan 2018 08:38)  T(F): 98.3 (26 Jan 2018 08:38), Max: 98.3 (26 Jan 2018 08:38)  HR: 65 (26 Jan 2018 08:38) (65 - 77)  BP: 144/72 (26 Jan 2018 08:38) (135/69 - 155/83)  BP(mean): --  RR: 16 (26 Jan 2018 08:38) (16 - 18)  SpO2: 94% (26 Jan 2018 08:38) (93% - 94%)                          13.2   12.32 )-----------( 124      ( 26 Jan 2018 07:33 )             38.2                         14.7   9.6   )-----------( 126      ( 25 Jan 2018 06:41 )             40.7       01-26    138  |  99  |  14  ----------------------------<  115<H>  4.1   |  26  |  0.71 Ortho Progress Note    S: Patient seen and examined. No acute events overnight. Pain well controlled with current regimen. Denies lightheadedness/dizziness, CP/SOB. Tolerating diet.       O:  Physical Exam:  Gen: Laying in bed, NAD, alert and oriented.   Resp: Unlabored breathing  Ext: In [place] J collar  +EHL/FHL/TA/GS  +AIN/PIN/M/R/U/Msc/Ax  SILT L3-S1  SILT C5-T1  BUE WWP    Incision -C/D/I      Vital Signs Last 24 Hrs  T(C): 36.8 (26 Jan 2018 08:38), Max: 36.8 (26 Jan 2018 08:38)  T(F): 98.3 (26 Jan 2018 08:38), Max: 98.3 (26 Jan 2018 08:38)  HR: 65 (26 Jan 2018 08:38) (65 - 77)  BP: 144/72 (26 Jan 2018 08:38) (135/69 - 155/83)  BP(mean): --  RR: 16 (26 Jan 2018 08:38) (16 - 18)  SpO2: 94% (26 Jan 2018 08:38) (93% - 94%)                          13.2   12.32 )-----------( 124      ( 26 Jan 2018 07:33 )             38.2                         14.7   9.6   )-----------( 126      ( 25 Jan 2018 06:41 )             40.7       01-26    138  |  99  |  14  ----------------------------<  115<H>  4.1   |  26  |  0.71
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
## 2018-01-26 NOTE — DISCHARGE NOTE ADULT - PLAN OF CARE
improved ADL's, pain control Please call Dr. Contreras's office on discharge from hospital to make follow up appointment for 7-10 days.  Cervical collar at all times, weight bearing as tolerated.  Complete your steroid taper as prescribed.  Keep dressing clean, dry, and intact. Please call Dr. Contreras's office on discharge from hospital to make follow up appointment for 7-10 days.  Cervical collar at all times, weight bearing as tolerated.  Follow up Dr. Contreras' s instruction sheet

## 2018-01-27 VITALS
SYSTOLIC BLOOD PRESSURE: 128 MMHG | DIASTOLIC BLOOD PRESSURE: 75 MMHG | RESPIRATION RATE: 18 BRPM | HEART RATE: 65 BPM | TEMPERATURE: 98 F | OXYGEN SATURATION: 96 %

## 2018-01-27 LAB
ANION GAP SERPL CALC-SCNC: 16 MMOL/L — SIGNIFICANT CHANGE UP (ref 5–17)
BASOPHILS # BLD AUTO: 0.01 K/UL — SIGNIFICANT CHANGE UP (ref 0–0.2)
BASOPHILS NFR BLD AUTO: 0.1 % — SIGNIFICANT CHANGE UP (ref 0–2)
BUN SERPL-MCNC: 14 MG/DL — SIGNIFICANT CHANGE UP (ref 7–23)
CALCIUM SERPL-MCNC: 9.4 MG/DL — SIGNIFICANT CHANGE UP (ref 8.4–10.5)
CHLORIDE SERPL-SCNC: 101 MMOL/L — SIGNIFICANT CHANGE UP (ref 96–108)
CO2 SERPL-SCNC: 23 MMOL/L — SIGNIFICANT CHANGE UP (ref 22–31)
CREAT SERPL-MCNC: 0.8 MG/DL — SIGNIFICANT CHANGE UP (ref 0.5–1.3)
EOSINOPHIL # BLD AUTO: 0.02 K/UL — SIGNIFICANT CHANGE UP (ref 0–0.5)
EOSINOPHIL NFR BLD AUTO: 0.2 % — SIGNIFICANT CHANGE UP (ref 0–6)
GLUCOSE SERPL-MCNC: 132 MG/DL — HIGH (ref 70–99)
HCT VFR BLD CALC: 39.4 % — SIGNIFICANT CHANGE UP (ref 39–50)
HGB BLD-MCNC: 13.7 G/DL — SIGNIFICANT CHANGE UP (ref 13–17)
IMM GRANULOCYTES NFR BLD AUTO: 0.1 % — SIGNIFICANT CHANGE UP (ref 0–1.5)
LYMPHOCYTES # BLD AUTO: 2.01 K/UL — SIGNIFICANT CHANGE UP (ref 1–3.3)
LYMPHOCYTES # BLD AUTO: 21 % — SIGNIFICANT CHANGE UP (ref 13–44)
MCHC RBC-ENTMCNC: 31.8 PG — SIGNIFICANT CHANGE UP (ref 27–34)
MCHC RBC-ENTMCNC: 34.8 GM/DL — SIGNIFICANT CHANGE UP (ref 32–36)
MCV RBC AUTO: 91.4 FL — SIGNIFICANT CHANGE UP (ref 80–100)
MONOCYTES # BLD AUTO: 0.94 K/UL — HIGH (ref 0–0.9)
MONOCYTES NFR BLD AUTO: 9.8 % — SIGNIFICANT CHANGE UP (ref 2–14)
NEUTROPHILS # BLD AUTO: 6.6 K/UL — SIGNIFICANT CHANGE UP (ref 1.8–7.4)
NEUTROPHILS NFR BLD AUTO: 68.8 % — SIGNIFICANT CHANGE UP (ref 43–77)
PLATELET # BLD AUTO: 135 K/UL — LOW (ref 150–400)
POTASSIUM SERPL-MCNC: 4.1 MMOL/L — SIGNIFICANT CHANGE UP (ref 3.5–5.3)
POTASSIUM SERPL-SCNC: 4.1 MMOL/L — SIGNIFICANT CHANGE UP (ref 3.5–5.3)
RBC # BLD: 4.31 M/UL — SIGNIFICANT CHANGE UP (ref 4.2–5.8)
RBC # FLD: 12.5 % — SIGNIFICANT CHANGE UP (ref 10.3–14.5)
SODIUM SERPL-SCNC: 140 MMOL/L — SIGNIFICANT CHANGE UP (ref 135–145)
WBC # BLD: 9.59 K/UL — SIGNIFICANT CHANGE UP (ref 3.8–10.5)
WBC # FLD AUTO: 9.59 K/UL — SIGNIFICANT CHANGE UP (ref 3.8–10.5)

## 2018-01-27 PROCEDURE — C1713: CPT

## 2018-01-27 PROCEDURE — 76000 FLUOROSCOPY <1 HR PHYS/QHP: CPT

## 2018-01-27 PROCEDURE — 80048 BASIC METABOLIC PNL TOTAL CA: CPT

## 2018-01-27 PROCEDURE — 97530 THERAPEUTIC ACTIVITIES: CPT

## 2018-01-27 PROCEDURE — 86901 BLOOD TYPING SEROLOGIC RH(D): CPT

## 2018-01-27 PROCEDURE — 86900 BLOOD TYPING SEROLOGIC ABO: CPT

## 2018-01-27 PROCEDURE — 85027 COMPLETE CBC AUTOMATED: CPT

## 2018-01-27 PROCEDURE — C1889: CPT

## 2018-01-27 PROCEDURE — 97116 GAIT TRAINING THERAPY: CPT

## 2018-01-27 PROCEDURE — 97162 PT EVAL MOD COMPLEX 30 MIN: CPT

## 2018-01-27 RX ORDER — OXYCODONE HYDROCHLORIDE 5 MG/1
1 TABLET ORAL
Qty: 0 | Refills: 0 | COMMUNITY
Start: 2018-01-27

## 2018-01-27 RX ORDER — NIACIN 50 MG
0 TABLET ORAL
Qty: 0 | Refills: 0 | COMMUNITY

## 2018-01-27 RX ORDER — OXYCODONE HYDROCHLORIDE 5 MG/1
1 TABLET ORAL
Qty: 50 | Refills: 0 | OUTPATIENT
Start: 2018-01-27

## 2018-01-27 RX ORDER — OMEGA-3 ACID ETHYL ESTERS 1 G
0 CAPSULE ORAL
Qty: 0 | Refills: 0 | COMMUNITY

## 2018-01-27 RX ADMIN — Medication 1 MILLIGRAM(S): at 06:11

## 2018-01-27 RX ADMIN — Medication 1 MILLIGRAM(S): at 00:18

## 2018-01-27 NOTE — PROGRESS NOTE ADULT - SUBJECTIVE AND OBJECTIVE BOX
ORTHO  Patient is a 57y old  Male who presents with a chief complaint of neck pain-C5-C7 Posterior Spinal Fusion (26 Jan 2018 10:23)    Pt. resting without complaint    VS-  T(C): 36.8 (01-27-18 @ 04:52), Max: 36.8 (01-26-18 @ 08:38)  HR: 63 (01-27-18 @ 04:52) (63 - 71)  BP: 136/78 (01-27-18 @ 04:52) (123/75 - 145/81)  RR: 16 (01-27-18 @ 04:52) (16 - 16)  SpO2: 96% (01-27-18 @ 04:52) (93% - 96%)  Wt(kg): --           M.S. A&O         C-collar- [place] applied         Wound- C/D/I         Neuro- (+) , bicep, triceps- 5+/5                  - sensation grossly intact to light touch                        13.2   12.32 )-----------( 124      ( 26 Jan 2018 07:33 )             38.2     01-26    138  |  99  |  14  ----------------------------<  115<H>  4.1   |  26  |  0.71    Ca    8.8      26 Jan 2018 07:33             Impression: Stable       Plan:   Continue present treatment                 Out of bed, ambulate                  Physical therapy follow up                  Continue to monitor    Don Faulkner PA-C  Orthopaedic Surgery  Team pager [phone]  [phone]

## 2018-01-30 ENCOUNTER — APPOINTMENT (OUTPATIENT)
Dept: ORTHOPEDIC SURGERY | Facility: CLINIC | Age: 58
End: 2018-01-30

## 2018-01-30 ENCOUNTER — RX RENEWAL (OUTPATIENT)
Age: 58
End: 2018-01-30

## 2018-02-02 ENCOUNTER — RX RENEWAL (OUTPATIENT)
Age: 58
End: 2018-02-02

## 2018-02-02 ENCOUNTER — APPOINTMENT (OUTPATIENT)
Dept: ORTHOPEDIC SURGERY | Facility: CLINIC | Age: 58
End: 2018-02-02
Payer: OTHER MISCELLANEOUS

## 2018-02-02 PROCEDURE — 72040 X-RAY EXAM NECK SPINE 2-3 VW: CPT

## 2018-02-02 PROCEDURE — 99024 POSTOP FOLLOW-UP VISIT: CPT

## 2018-02-02 RX ORDER — OXYCODONE HYDROCHLORIDE 5 MG/1
5 CAPSULE ORAL
Qty: 90 | Refills: 0 | Status: DISCONTINUED | COMMUNITY
Start: 2018-02-02 | End: 2018-02-02

## 2018-02-02 RX ORDER — SENNOSIDES 25 MG
25 TABLET ORAL
Qty: 60 | Refills: 0 | Status: ACTIVE | COMMUNITY
Start: 2018-02-02 | End: 1900-01-01

## 2018-02-02 RX ORDER — DOCUSATE SODIUM 100 MG/1
100 CAPSULE ORAL 3 TIMES DAILY
Qty: 90 | Refills: 3 | Status: ACTIVE | COMMUNITY
Start: 2018-02-02 | End: 1900-01-01

## 2018-02-02 RX ORDER — OXYCODONE 5 MG/1
5 TABLET ORAL EVERY 8 HOURS
Qty: 180 | Refills: 0 | Status: DISCONTINUED | COMMUNITY
Start: 2018-02-02 | End: 2018-02-02

## 2018-02-22 ENCOUNTER — MOBILE ON CALL (OUTPATIENT)
Age: 58
End: 2018-02-22

## 2018-03-02 ENCOUNTER — APPOINTMENT (OUTPATIENT)
Dept: ORTHOPEDIC SURGERY | Facility: CLINIC | Age: 58
End: 2018-03-02
Payer: OTHER MISCELLANEOUS

## 2018-03-02 PROCEDURE — 72040 X-RAY EXAM NECK SPINE 2-3 VW: CPT

## 2018-03-02 PROCEDURE — 99024 POSTOP FOLLOW-UP VISIT: CPT

## 2018-03-03 ENCOUNTER — MOBILE ON CALL (OUTPATIENT)
Age: 58
End: 2018-03-03

## 2018-04-06 ENCOUNTER — APPOINTMENT (OUTPATIENT)
Dept: ORTHOPEDIC SURGERY | Facility: CLINIC | Age: 58
End: 2018-04-06
Payer: OTHER MISCELLANEOUS

## 2018-04-06 PROCEDURE — 99024 POSTOP FOLLOW-UP VISIT: CPT

## 2018-04-06 PROCEDURE — 72040 X-RAY EXAM NECK SPINE 2-3 VW: CPT

## 2018-04-06 RX ORDER — OXYCODONE 5 MG/1
5 TABLET ORAL
Qty: 60 | Refills: 0 | Status: ACTIVE | COMMUNITY
Start: 2018-01-27 | End: 1900-01-01

## 2018-04-28 NOTE — H&P PST ADULT - NSANTHTOTALSCORECAL_ENT_A_CORE
Attending Kurtis: pt feeling better. ct negative for a cute pathology. pt given reports of images and labs. plan to d/c 2

## 2018-05-07 ENCOUNTER — APPOINTMENT (OUTPATIENT)
Dept: ORTHOPEDIC SURGERY | Facility: CLINIC | Age: 58
End: 2018-05-07
Payer: OTHER MISCELLANEOUS

## 2018-05-07 PROCEDURE — 72040 X-RAY EXAM NECK SPINE 2-3 VW: CPT

## 2018-05-07 PROCEDURE — 99214 OFFICE O/P EST MOD 30 MIN: CPT

## 2018-06-01 ENCOUNTER — APPOINTMENT (OUTPATIENT)
Dept: ORTHOPEDIC SURGERY | Facility: CLINIC | Age: 58
End: 2018-06-01
Payer: MEDICARE

## 2018-06-01 VITALS — WEIGHT: 255 LBS | HEIGHT: 74 IN | BODY MASS INDEX: 32.73 KG/M2

## 2018-06-01 DIAGNOSIS — M25.561 PAIN IN RIGHT KNEE: ICD-10-CM

## 2018-06-01 PROCEDURE — 99215 OFFICE O/P EST HI 40 MIN: CPT | Mod: 25

## 2018-06-01 PROCEDURE — 20610 DRAIN/INJ JOINT/BURSA W/O US: CPT | Mod: RT

## 2018-06-01 PROCEDURE — 73562 X-RAY EXAM OF KNEE 3: CPT | Mod: RT

## 2018-06-01 PROCEDURE — 73560 X-RAY EXAM OF KNEE 1 OR 2: CPT | Mod: LT

## 2018-07-09 ENCOUNTER — APPOINTMENT (OUTPATIENT)
Dept: ORTHOPEDIC SURGERY | Facility: CLINIC | Age: 58
End: 2018-07-09
Payer: OTHER MISCELLANEOUS

## 2018-07-09 PROCEDURE — ZZZZZ: CPT

## 2018-07-09 PROCEDURE — 72040 X-RAY EXAM NECK SPINE 2-3 VW: CPT

## 2018-07-09 PROCEDURE — 99214 OFFICE O/P EST MOD 30 MIN: CPT

## 2018-07-17 PROBLEM — E78.5 HYPERLIPIDEMIA, UNSPECIFIED: Chronic | Status: ACTIVE | Noted: 2018-01-18

## 2018-07-27 ENCOUNTER — APPOINTMENT (OUTPATIENT)
Dept: ORTHOPEDIC SURGERY | Facility: CLINIC | Age: 58
End: 2018-07-27
Payer: MEDICARE

## 2018-07-27 PROCEDURE — 20610 DRAIN/INJ JOINT/BURSA W/O US: CPT | Mod: RT

## 2018-08-03 ENCOUNTER — APPOINTMENT (OUTPATIENT)
Dept: ORTHOPEDIC SURGERY | Facility: CLINIC | Age: 58
End: 2018-08-03
Payer: MEDICARE

## 2018-08-03 PROCEDURE — 20610 DRAIN/INJ JOINT/BURSA W/O US: CPT | Mod: RT

## 2018-08-10 ENCOUNTER — APPOINTMENT (OUTPATIENT)
Dept: ORTHOPEDIC SURGERY | Facility: CLINIC | Age: 58
End: 2018-08-10
Payer: MEDICARE

## 2018-08-10 DIAGNOSIS — M17.11 UNILATERAL PRIMARY OSTEOARTHRITIS, RIGHT KNEE: ICD-10-CM

## 2018-08-10 PROBLEM — M54.2 CERVICALGIA: Chronic | Status: ACTIVE | Noted: 2018-01-18

## 2018-08-10 PROBLEM — M54.9 DORSALGIA, UNSPECIFIED: Chronic | Status: ACTIVE | Noted: 2018-01-18
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
## 2018-08-10 PROCEDURE — 20610 DRAIN/INJ JOINT/BURSA W/O US: CPT | Mod: RT

## 2018-09-14 ENCOUNTER — APPOINTMENT (OUTPATIENT)
Dept: ORTHOPEDIC SURGERY | Facility: CLINIC | Age: 58
End: 2018-09-14
Payer: MEDICARE

## 2018-09-14 DIAGNOSIS — M17.12 UNILATERAL PRIMARY OSTEOARTHRITIS, LEFT KNEE: ICD-10-CM

## 2018-09-14 DIAGNOSIS — M25.562 PAIN IN LEFT KNEE: ICD-10-CM

## 2018-09-14 PROCEDURE — 20610 DRAIN/INJ JOINT/BURSA W/O US: CPT | Mod: LT

## 2018-09-14 PROCEDURE — 73564 X-RAY EXAM KNEE 4 OR MORE: CPT | Mod: LT

## 2018-09-14 PROCEDURE — 73560 X-RAY EXAM OF KNEE 1 OR 2: CPT | Mod: RT

## 2018-09-14 PROCEDURE — 99214 OFFICE O/P EST MOD 30 MIN: CPT | Mod: 25

## 2018-10-29 ENCOUNTER — APPOINTMENT (OUTPATIENT)
Dept: ORTHOPEDIC SURGERY | Facility: CLINIC | Age: 58
End: 2018-10-29
Payer: OTHER MISCELLANEOUS

## 2018-10-29 DIAGNOSIS — Z98.1 ARTHRODESIS STATUS: ICD-10-CM

## 2018-10-29 DIAGNOSIS — M96.0 PSEUDARTHROSIS AFTER FUSION OR ARTHRODESIS: ICD-10-CM

## 2018-10-29 PROCEDURE — 99214 OFFICE O/P EST MOD 30 MIN: CPT

## 2018-10-29 PROCEDURE — 72040 X-RAY EXAM NECK SPINE 2-3 VW: CPT

## 2018-12-24 ENCOUNTER — APPOINTMENT (OUTPATIENT)
Dept: ORTHOPEDIC SURGERY | Facility: CLINIC | Age: 58
End: 2018-12-24
Payer: OTHER MISCELLANEOUS

## 2018-12-24 PROCEDURE — 99215 OFFICE O/P EST HI 40 MIN: CPT

## 2018-12-24 PROCEDURE — 72100 X-RAY EXAM L-S SPINE 2/3 VWS: CPT

## 2018-12-26 ENCOUNTER — APPOINTMENT (OUTPATIENT)
Dept: CARDIOLOGY | Facility: CLINIC | Age: 58
End: 2018-12-26
Payer: MEDICARE

## 2018-12-26 ENCOUNTER — NON-APPOINTMENT (OUTPATIENT)
Age: 58
End: 2018-12-26

## 2018-12-26 VITALS
TEMPERATURE: 97.9 F | HEIGHT: 74 IN | RESPIRATION RATE: 17 BRPM | WEIGHT: 250 LBS | OXYGEN SATURATION: 99 % | SYSTOLIC BLOOD PRESSURE: 166 MMHG | HEART RATE: 90 BPM | BODY MASS INDEX: 32.08 KG/M2 | DIASTOLIC BLOOD PRESSURE: 95 MMHG

## 2018-12-26 PROCEDURE — 99205 OFFICE O/P NEW HI 60 MIN: CPT

## 2018-12-26 PROCEDURE — 93306 TTE W/DOPPLER COMPLETE: CPT

## 2018-12-26 PROCEDURE — 93000 ELECTROCARDIOGRAM COMPLETE: CPT

## 2018-12-28 NOTE — DISCUSSION/SUMMARY
[FreeTextEntry1] : 58 year man with a history as listed presents for an initial cardiac evaluation. \par Mark may been more urgent spinal surgery in the near future. He had a recent coronary CT with significant calcification in all vessels. He denies any anginal symptoms. Clinically he is euvolemic on exam. His EKG did not reveal any significant ischemic changes. At this time. he will undergo a pharmacological nuclear stress test to rule out obstructive CAD. He will get a 2d echo to assess for any  new structural heart disease, changes in valvular and ventricular function. \par If his nuclear stress test is without high risk features he may proceed with his planned surgery. \par His blood pressure is elevated. I will start him on Toprol 50mg Qday. \par He will continue with statin therapy. Will fax me recent labs. \par Smoking cessation counseling was performed. \par He will followup with me in 1-2  months or sooner if necessary. \par

## 2018-12-28 NOTE — PHYSICAL EXAM
[Well Groomed] : well groomed [General Appearance - In No Acute Distress] : no acute distress [Normal Conjunctiva] : the conjunctiva exhibited no abnormalities [Eyelids - No Xanthelasma] : the eyelids demonstrated no xanthelasmas [Normal Oral Mucosa] : normal oral mucosa [No Oral Pallor] : no oral pallor [No Oral Cyanosis] : no oral cyanosis [Normal Jugular Venous A Waves Present] : normal jugular venous A waves present [Normal Jugular Venous V Waves Present] : normal jugular venous V waves present [No Jugular Venous Anguiano A Waves] : no jugular venous anguiano A waves [Normal Rate] : normal [Rhythm Regular] : regular [Normal S1] : normal S1 [Normal S2] : normal S2 [No Gallop] : no gallop heard [No Murmur] : no murmurs heard [2+] : left 2+ [Right Carotid Bruit] : no bruit heard over the right carotid [Left Carotid Bruit] : no bruit heard over the left carotid [Bruit] : no bruit heard [No Pitting Edema] : no pitting edema present [Respiration, Rhythm And Depth] : normal respiratory rhythm and effort [Exaggerated Use Of Accessory Muscles For Inspiration] : no accessory muscle use [Auscultation Breath Sounds / Voice Sounds] : lungs were clear to auscultation bilaterally [Abdomen Soft] : soft [Abdomen Tenderness] : non-tender [Abdomen Mass (___ Cm)] : no abdominal mass palpated [Abnormal Walk] : normal gait [Nail Clubbing] : no clubbing of the fingernails [Cyanosis, Localized] : no localized cyanosis [Petechial Hemorrhages (___cm)] : no petechial hemorrhages [Skin Color & Pigmentation] : normal skin color and pigmentation [] : no rash [No Venous Stasis] : no venous stasis [Skin Lesions] : no skin lesions [No Skin Ulcers] : no skin ulcer [No Xanthoma] : no  xanthoma was observed [Oriented To Time, Place, And Person] : oriented to person, place, and time [Affect] : the affect was normal [Mood] : the mood was normal [No Anxiety] : not feeling anxious

## 2018-12-28 NOTE — HISTORY OF PRESENT ILLNESS
[FreeTextEntry1] : 58 year old man with a history of cauda equina syndrome, hyperlipidemia, early family history of CAD, coronary calcifications, smoker presents for an initial cardiac evaluation. \par \par He is here for a second opinion. \par He was sent to [place] for a cardiac CT by his primary cardiologist and was aborted secondary to calcium. \par \par He recently stopped exercising from his back and knee pain. He states that he needs more urgent  lumbar laminectomy given risk of potential cauda equina. He was up to 2 weeks ago exercising on the treadmill with walking without any anginal symptoms. He   denies any chest pain, PND, orthopnea, lower extremity edema, near syncope, syncope, strokelike symptoms. He is very anxious about the calcifications seen on his cardiac CT. \par \par

## 2019-01-17 ENCOUNTER — APPOINTMENT (OUTPATIENT)
Dept: CARDIOLOGY | Facility: CLINIC | Age: 59
End: 2019-01-17

## 2019-01-25 ENCOUNTER — APPOINTMENT (OUTPATIENT)
Dept: CARDIOLOGY | Facility: CLINIC | Age: 59
End: 2019-01-25
Payer: MEDICARE

## 2019-01-25 PROCEDURE — A9500: CPT

## 2019-01-25 PROCEDURE — 78452 HT MUSCLE IMAGE SPECT MULT: CPT

## 2019-01-25 PROCEDURE — 93015 CV STRESS TEST SUPVJ I&R: CPT

## 2019-01-28 ENCOUNTER — APPOINTMENT (OUTPATIENT)
Dept: ORTHOPEDIC SURGERY | Facility: CLINIC | Age: 59
End: 2019-01-28
Payer: OTHER MISCELLANEOUS

## 2019-01-28 PROCEDURE — 99214 OFFICE O/P EST MOD 30 MIN: CPT

## 2019-01-28 NOTE — PHYSICAL EXAM
[UE] : 5/5 motor strength in bilateral upper extremities [0] : left brachioradialis 0 [ALL] : dorsalis pedis, posterior tibial, femoral, popliteal, and radial 2+ and symmetric bilaterally [Dos Santos's Sign] : negative Dos Santos's sign [Pronator Drift] : negative pronator drift [SLR] : negative straight leg raise [Normal] : Oriented to person, place, and time, insight and judgement were intact and the affect was normal [de-identified] : Range of Motion: is full and not painful. Cervical. \par Spine: NTTP C spine and b/l paracervicals.\par Skin intact C spine. No rashes, ulcers, blisters. \par No lymphedema. \par \par well healed anterior and posterior cervical incision\par  [de-identified] : 2 views AP and Lat of Lumbar Spine from [id number]. Read and dictated by Dr. Momo Contreras Board Certified Orthopaedic surgeon 12/24/2018 - Lumbar spondylosis\par \par \par \par 2 views AP and Lat of Cervical Spine from occipital to C7/T1 07/09/18. Read and dictated by Dr. Momo Contreras Board Certified orthopaedic surgeon\par Posterior Cervical Spinal Fusion C5-C7 with posterolateral fusion\par \par 2 views AP and Lat of Cervical Spine from occipital to C7/T1 05/07/18. Read and dictated by Dr. Momo Contreras Board Certified orthopaedic surgeon 5/7/2018 10:52 AM Posterior Cervical Spinal Fusion C5-C7 with posterolateral fusion\par \par MRI lumbar spine (11/21/18): impression: findings most notable for a new disc herniation at L4-L5 with progressive, severe central stenosis. There are progressive type I Modic endplate changes at L4-L5. The remaining levels are similar in appearance compared to April 2016. There is also severe central stenosis at L3-L4 and moderate central stenosis at L2-L3. T11-T12: there is a partially imaged central disc herniation impressing upon the ventral thecal sac seen only on sagittal images. T12-L1: there is a disc bulge impressing upon the ventral thecal sac without significant stenosis. L1-L2: there is disc bulging and a right subarticular disc herniation impinging upon the descending right L2 nerve roots in the right lateral recess. There is mild central stenosis. The neural foramina are patent. L2-L3: there is a disc bulge impressing upon the ventral thecal sac with moderate central stenosis. There is mild bilateral foraminal stenosis. L3-L4: ther eis a disc bulge impressing upon the ventral thecal sac with severe central stenosis. There is mild right and moderate left foraminal stenosis. L4-L5: there is a disc bulge and superimposed central/right paracentral extruded disc herniation extending below the disc space and resulting in severe central stenosis with asymmetric impingement of the descending right L5 nerve roots in the right lateral recess. There is mild to moderate right foraminal stenosis. L5-S1: There is disc bulging and an extruded disc herniation extending below the disc space in the right paracentral zone and impingement upon the descending right S1 nerve roots in the right lateral recess. There is moderate bilateral foraminal stenosis with disc material impinging upon the exiting L5 nerve roots. \par \par

## 2019-01-28 NOTE — HISTORY OF PRESENT ILLNESS
[None] : No exacerbating factors are noted [Exercise Regimen] : relieved by exercise regimen [Rest] : relieved by rest [de-identified] : 58 y.o. male s/p C5-C7 Posterior Spinal Fusion 01/24/18, lumbar stenosis, and herniated disc with severe stenosis L3-L5, patient is at risk for cauda equina syndrome. Presents today for f/u. Pt reports well since last visit. States he has no pain, tingling or weakness on UE, symptoms stable since last visit. Pt has mild numbness on B/L thumbs. Symptoms greatly improved vs presurgical symptoms. Axial back pain, lumbar radiculopathy chronic nature. Pt denies any new symptoms/injuries to his spine.

## 2019-01-28 NOTE — DISCUSSION/SUMMARY
[de-identified] : 57 yo male with lumbar stenosis, and herniated disc with severe stenosis L3-L5, patient is at risk for cauda equina syndrome, recommend laminectomy and discectomy. \par \par I reviewed all major risks, benefits, and complications associated with lumbar laminectomy and/or microdiscectomy including but not limited to bleeding, infection, CSF leak, neurological injury, chronic pain, reherniation, hematoma worsening of pain, anesthetic risk, chronic pain and disability, need for further surgical intervention, medical complications (GI, , DVT, PE, cardiac, pulmonary, etc) and risk of mortality.

## 2019-02-06 ENCOUNTER — NON-APPOINTMENT (OUTPATIENT)
Age: 59
End: 2019-02-06

## 2019-02-06 ENCOUNTER — APPOINTMENT (OUTPATIENT)
Dept: CARDIOLOGY | Facility: CLINIC | Age: 59
End: 2019-02-06
Payer: MEDICARE

## 2019-02-06 VITALS
OXYGEN SATURATION: 98 % | DIASTOLIC BLOOD PRESSURE: 96 MMHG | RESPIRATION RATE: 17 BRPM | WEIGHT: 249 LBS | HEIGHT: 74 IN | HEART RATE: 75 BPM | SYSTOLIC BLOOD PRESSURE: 149 MMHG | TEMPERATURE: 97.9 F | BODY MASS INDEX: 31.95 KG/M2

## 2019-02-06 DIAGNOSIS — G83.4 CAUDA EQUINA SYNDROME: ICD-10-CM

## 2019-02-06 PROCEDURE — 93000 ELECTROCARDIOGRAM COMPLETE: CPT

## 2019-02-06 PROCEDURE — 99214 OFFICE O/P EST MOD 30 MIN: CPT

## 2019-02-06 RX ORDER — ROSUVASTATIN CALCIUM 5 MG/1
5 TABLET, FILM COATED ORAL
Refills: 0 | Status: ACTIVE | COMMUNITY

## 2019-02-06 NOTE — HISTORY OF PRESENT ILLNESS
[FreeTextEntry1] : 58 year old man with a history of cauda equina syndrome, hyperlipidemia, early family history of CAD, coronary calcifications, smoker.\par \par He was sent to [employer] for a cardiac CT by his primary cardiologist and was aborted secondary to calcium. Though he underwent a pharmacological nuclear stress test in our office on 1/25/19 that was not significant for ischemia. He was noted to have increased PVCs. \par \par He recently stopped exercising from his back and knee pain. He states that he needs more urgent  lumbar laminectomy given risk of potential cauda equina. He is now schedule 2/13/19.\par \par  He   denies any chest pain, PND, orthopnea, lower extremity edema, near syncope, syncope, strokelike symptoms. He is very anxious  about his upcoming surgery. \par \par

## 2019-02-06 NOTE — DISCUSSION/SUMMARY
[FreeTextEntry1] : 58 year man with a history as listed presents for a followup cardiac evaluation. \par Mark may been more urgent spinal surgery in the near future. He had a recent coronary CT with significant calcification in all vessels. Though the pharmacological nuclear stress test on 1/25/19 was unrevealing for ischemia. He frequent PVCs noted on the stress that likely was reactive. His EKG today was normal. He had an echo in  that showed normal systolic LV function without any significant other findings, including no significant valvular disease. \par He currently he denies any anginal symptoms. Clinically he is euvolemic on exam. His EKG did not reveal any significant ischemic changes. \par His blood pressure is elevated but likely reactive to pain and stress. He will increase his Toprol to 100mg Qday for his BP and PVCs. \par  He currently has no active cardiac conditions. He may proceed with the planned needed urgent spinal  surgery without any further cardiac workup. Routine hemodynamic monitoring is suggested during the procedure. \par He will continue with statin therapy. Will fax me recent labs. \par Smoking cessation counseling was performed. \par He will followup with me post operatively. \par

## 2019-02-06 NOTE — PHYSICAL EXAM
[Well Groomed] : well groomed [General Appearance - In No Acute Distress] : no acute distress [Normal Conjunctiva] : the conjunctiva exhibited no abnormalities [Eyelids - No Xanthelasma] : the eyelids demonstrated no xanthelasmas [Normal Oral Mucosa] : normal oral mucosa [No Oral Pallor] : no oral pallor [No Oral Cyanosis] : no oral cyanosis [Normal Jugular Venous A Waves Present] : normal jugular venous A waves present [Normal Jugular Venous V Waves Present] : normal jugular venous V waves present [No Jugular Venous Anguiano A Waves] : no jugular venous anguiano A waves [Respiration, Rhythm And Depth] : normal respiratory rhythm and effort [Exaggerated Use Of Accessory Muscles For Inspiration] : no accessory muscle use [Auscultation Breath Sounds / Voice Sounds] : lungs were clear to auscultation bilaterally [Abdomen Soft] : soft [Abdomen Tenderness] : non-tender [Abdomen Mass (___ Cm)] : no abdominal mass palpated [Abnormal Walk] : normal gait [Nail Clubbing] : no clubbing of the fingernails [Cyanosis, Localized] : no localized cyanosis [Petechial Hemorrhages (___cm)] : no petechial hemorrhages [Skin Color & Pigmentation] : normal skin color and pigmentation [] : no rash [No Venous Stasis] : no venous stasis [Skin Lesions] : no skin lesions [No Skin Ulcers] : no skin ulcer [No Xanthoma] : no  xanthoma was observed [Oriented To Time, Place, And Person] : oriented to person, place, and time [Affect] : the affect was normal [Mood] : the mood was normal [No Anxiety] : not feeling anxious [Normal Rate] : normal [Rhythm Regular] : regular [Normal S1] : normal S1 [Normal S2] : normal S2 [No Gallop] : no gallop heard [No Murmur] : no murmurs heard [2+] : left 2+ [No Pitting Edema] : no pitting edema present [Right Carotid Bruit] : no bruit heard over the right carotid [Left Carotid Bruit] : no bruit heard over the left carotid [Bruit] : no bruit heard

## 2019-02-07 ENCOUNTER — OUTPATIENT (OUTPATIENT)
Dept: OUTPATIENT SERVICES | Facility: HOSPITAL | Age: 59
LOS: 1 days | End: 2019-02-07
Payer: COMMERCIAL

## 2019-02-07 VITALS
TEMPERATURE: 98 F | SYSTOLIC BLOOD PRESSURE: 148 MMHG | RESPIRATION RATE: 15 BRPM | WEIGHT: 251.11 LBS | HEIGHT: 74 IN | HEART RATE: 76 BPM | OXYGEN SATURATION: 98 % | DIASTOLIC BLOOD PRESSURE: 88 MMHG

## 2019-02-07 DIAGNOSIS — Z29.9 ENCOUNTER FOR PROPHYLACTIC MEASURES, UNSPECIFIED: ICD-10-CM

## 2019-02-07 DIAGNOSIS — M54.9 DORSALGIA, UNSPECIFIED: ICD-10-CM

## 2019-02-07 DIAGNOSIS — G83.4 CAUDA EQUINA SYNDROME: ICD-10-CM

## 2019-02-07 DIAGNOSIS — Z98.890 OTHER SPECIFIED POSTPROCEDURAL STATES: Chronic | ICD-10-CM

## 2019-02-07 DIAGNOSIS — I10 ESSENTIAL (PRIMARY) HYPERTENSION: ICD-10-CM

## 2019-02-07 DIAGNOSIS — M43.22 FUSION OF SPINE, CERVICAL REGION: Chronic | ICD-10-CM

## 2019-02-07 DIAGNOSIS — Z01.818 ENCOUNTER FOR OTHER PREPROCEDURAL EXAMINATION: ICD-10-CM

## 2019-02-07 LAB
ANION GAP SERPL CALC-SCNC: 17 MMOL/L — SIGNIFICANT CHANGE UP (ref 5–17)
BUN SERPL-MCNC: 11 MG/DL — SIGNIFICANT CHANGE UP (ref 7–23)
CALCIUM SERPL-MCNC: 9.8 MG/DL — SIGNIFICANT CHANGE UP (ref 8.4–10.5)
CHLORIDE SERPL-SCNC: 101 MMOL/L — SIGNIFICANT CHANGE UP (ref 96–108)
CO2 SERPL-SCNC: 22 MMOL/L — SIGNIFICANT CHANGE UP (ref 22–31)
CREAT SERPL-MCNC: 0.77 MG/DL — SIGNIFICANT CHANGE UP (ref 0.5–1.3)
GLUCOSE SERPL-MCNC: 136 MG/DL — HIGH (ref 70–99)
HCT VFR BLD CALC: 41.1 % — SIGNIFICANT CHANGE UP (ref 39–50)
HGB BLD-MCNC: 14.3 G/DL — SIGNIFICANT CHANGE UP (ref 13–17)
MCHC RBC-ENTMCNC: 31.3 PG — SIGNIFICANT CHANGE UP (ref 27–34)
MCHC RBC-ENTMCNC: 34.8 GM/DL — SIGNIFICANT CHANGE UP (ref 32–36)
MCV RBC AUTO: 89.9 FL — SIGNIFICANT CHANGE UP (ref 80–100)
MRSA PCR RESULT.: SIGNIFICANT CHANGE UP
PLATELET # BLD AUTO: 136 K/UL — LOW (ref 150–400)
POTASSIUM SERPL-MCNC: 4.3 MMOL/L — SIGNIFICANT CHANGE UP (ref 3.5–5.3)
POTASSIUM SERPL-SCNC: 4.3 MMOL/L — SIGNIFICANT CHANGE UP (ref 3.5–5.3)
RBC # BLD: 4.57 M/UL — SIGNIFICANT CHANGE UP (ref 4.2–5.8)
RBC # FLD: 13.4 % — SIGNIFICANT CHANGE UP (ref 10.3–14.5)
S AUREUS DNA NOSE QL NAA+PROBE: SIGNIFICANT CHANGE UP
SODIUM SERPL-SCNC: 140 MMOL/L — SIGNIFICANT CHANGE UP (ref 135–145)
WBC # BLD: 8.41 K/UL — SIGNIFICANT CHANGE UP (ref 3.8–10.5)
WBC # FLD AUTO: 8.41 K/UL — SIGNIFICANT CHANGE UP (ref 3.8–10.5)

## 2019-02-07 PROCEDURE — 85027 COMPLETE CBC AUTOMATED: CPT

## 2019-02-07 PROCEDURE — 87640 STAPH A DNA AMP PROBE: CPT

## 2019-02-07 PROCEDURE — 80048 BASIC METABOLIC PNL TOTAL CA: CPT

## 2019-02-07 PROCEDURE — G0463: CPT

## 2019-02-07 RX ORDER — CEFAZOLIN SODIUM 1 G
2000 VIAL (EA) INJECTION ONCE
Qty: 0 | Refills: 0 | Status: COMPLETED | OUTPATIENT
Start: 2019-02-13 | End: 2019-02-13

## 2019-02-07 RX ORDER — SODIUM CHLORIDE 9 MG/ML
3 INJECTION INTRAMUSCULAR; INTRAVENOUS; SUBCUTANEOUS EVERY 8 HOURS
Qty: 0 | Refills: 0 | Status: DISCONTINUED | OUTPATIENT
Start: 2019-02-13 | End: 2019-02-13

## 2019-02-07 RX ORDER — LIDOCAINE HCL 20 MG/ML
0.2 VIAL (ML) INJECTION ONCE
Qty: 0 | Refills: 0 | Status: DISCONTINUED | OUTPATIENT
Start: 2019-02-13 | End: 2019-02-13

## 2019-02-07 NOTE — H&P PST ADULT - PSH
Cervical vertebral fusion  2015, 1/2018  H/O shoulder surgery  right 2014 left 2004  S/p bilateral carpal tunnel release  2011 2012

## 2019-02-07 NOTE — H&P PST ADULT - RS GEN PE MLT RESP DETAILS PC
clear to auscultation bilaterally clear to auscultation bilaterally/no rales/respirations non-labored/no rhonchi/no wheezes

## 2019-02-07 NOTE — H&P PST ADULT - MUSCULOSKELETAL
details… No joint pain, swelling or deformity; no limitation of movement no calf tenderness/decreased ROM due to pain detailed exam

## 2019-02-07 NOTE — H&P PST ADULT - PROBLEM SELECTOR PLAN 1
C4-C7 posterior spinal fusion  MRSA/MSSA culture collected  EKG done prolonged surgery Scheduled L3-5 posterior lumbar laminectomy and discectomy 2/13/2019  Pre-op instructions given to pt, lab work sent Scheduled L3-5 posterior lumbar laminectomy and discectomy 2/13/2019  Pre-op instructions given to pt, lab work sent at [facility]  EKG done 2/6 NSR

## 2019-02-07 NOTE — H&P PST ADULT - ATTENDING COMMENTS
59 yo male with lumbar stenosis, and herniated disc with severe stenosis L3-L5, patient is at risk for cauda equina syndrome, recommend laminectomy and discectomy. Has weakness L>R L, L5 distribution.    I reviewed all major risks, benefits, and complications associated with lumbar laminectomy and/or microdiscectomy including but not limited to bleeding, infection, CSF leak, neurological injury, chronic pain, reherniation, hematoma worsening of pain, anesthetic risk, chronic pain and disability, need for further surgical intervention, medical complications (GI, , DVT, PE, cardiac, pulmonary, etc) and risk of mortality.

## 2019-02-07 NOTE — H&P PST ADULT - ACTIVITY
ADLs, walks dog 2xdaily, climbs steps daily slowly, grocery shopping, light housework, limited due to back pain
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
## 2019-02-07 NOTE — H&P PST ADULT - ASSESSMENT
CAPRINI SCORE [CLOT updated 18]    AGE RELATED RISK FACTORS                                                       MOBILITY RELATED FACTORS  [ ] Age 41-60 years                                            (1 Point)                    [ ] Bed rest                                                        (1 Point)  [ ] Age: 61-74 years                                           (2 Points)                  [ ] Plaster cast                                                   (2 Points)  [ ] Age= 75 years                                              (3 Points)                    [ ] Bed bound for more than 72 hours                 (2 Points)    DISEASE RELATED RISK FACTORS                                               GENDER SPECIFIC FACTORS  [ ] Edema in the lower extremities                       (1 Point)              [ ] Pregnancy                                                     (1 Point)  [ ] Varicose veins                                               (1 Point)                     [ ] Post-partum < 6 weeks                                   (1 Point)             [ ] BMI > 25 Kg/m2                                            (1 Point)                     [ ] Hormonal therapy  or oral contraception          (1 Point)                 [ ] Sepsis (in the previous month)                        (1 Point)               [ ] History of pregnancy complications                 (1 point)  [ ] Pneumonia or serious lung disease                                               [ ] Unexplained or recurrent                     (1 Point)           (in the previous month)                               (1 Point)  [ ] Abnormal pulmonary function test                     (1 Point)                 SURGERY RELATED RISK FACTORS  [ ] Acute myocardial infarction                              (1 Point)               [ ]  Section                                             (1 Point)  [ ] Congestive heart failure (in the previous month)  (1 Point)      [ ] Minor surgery                                                  (1 Point)   [ ] Inflammatory bowel disease                             (1 Point)               [ ] Arthroscopic surgery                                        (2 Points)  [ ] Central venous access                                      (2 Points)                [ ] General surgery lasting more than 45 minutes (2 points)  [ ] Present or previous malignancy                     (2 Points)                [ ] Elective arthroplasty                                         (5 points)    [ ] Stroke (in the previous month)                          (5 Points)                                                                                                                                                           HEMATOLOGY RELATED FACTORS                                                 TRAUMA RELATED RISK FACTORS  [ ] Prior episodes of VTE                                     (3 Points)                [ ] Fracture of the hip, pelvis, or leg                       (5 Points)  [ ] Positive family history for VTE                         (3 Points)             [ ] Acute spinal cord injury (in the previous month)  (5 Points)  [ ] Prothrombin 22254 A                                     (3 Points)               [ ] Paralysis  (less than 1 month)                             (5 Points)  [ ] Factor V Leiden                                             (3 Points)                  [ ] Multiple Trauma within 1 month                        (5 Points)  [ ] Lupus anticoagulants                                     (3 Points)                                                           [ ] Anticardiolipin antibodies                               (3 Points)                                                       [ ] High homocysteine in the blood                      (3 Points)                                             [ ] Other congenital or acquired thrombophilia      (3 Points)                                                [ ] Heparin induced thrombocytopenia                  (3 Points)                                     Total Score [          ] CAPRINI SCORE [CLOT updated 18]    AGE RELATED RISK FACTORS                                                       MOBILITY RELATED FACTORS  [x] Age 41-60 years                                            (1 Point)                    [ ] Bed rest                                                        (1 Point)  [ ] Age: 61-74 years                                           (2 Points)                  [ ] Plaster cast                                                   (2 Points)  [ ] Age= 75 years                                              (3 Points)                    [ ] Bed bound for more than 72 hours                 (2 Points)    DISEASE RELATED RISK FACTORS                                               GENDER SPECIFIC FACTORS  [ ] Edema in the lower extremities                       (1 Point)              [ ] Pregnancy                                                     (1 Point)  [ ] Varicose veins                                               (1 Point)                     [ ] Post-partum < 6 weeks                                   (1 Point)             [x BMI > 25 Kg/m2                                            (1 Point)                     [ ] Hormonal therapy  or oral contraception          (1 Point)                 [ ] Sepsis (in the previous month)                        (1 Point)               [ ] History of pregnancy complications                 (1 point)  [ ] Pneumonia or serious lung disease                                               [ ] Unexplained or recurrent                     (1 Point)           (in the previous month)                               (1 Point)  [ ] Abnormal pulmonary function test                     (1 Point)                 SURGERY RELATED RISK FACTORS  [ ] Acute myocardial infarction                              (1 Point)               [ ]  Section                                             (1 Point)  [ ] Congestive heart failure (in the previous month)  (1 Point)      [ ] Minor surgery                                                  (1 Point)   [ ] Inflammatory bowel disease                             (1 Point)               [ ] Arthroscopic surgery                                        (2 Points)  [ ] Central venous access                                      (2 Points)                [x] General surgery lasting more than 45 minutes (2 points)  [ ] Present or previous malignancy                     (2 Points)                [ ] Elective arthroplasty                                         (5 points)    [ ] Stroke (in the previous month)                          (5 Points)                                                                                                                                                           HEMATOLOGY RELATED FACTORS                                                 TRAUMA RELATED RISK FACTORS  [ ] Prior episodes of VTE                                     (3 Points)                [ ] Fracture of the hip, pelvis, or leg                       (5 Points)  [ ] Positive family history for VTE                         (3 Points)             [ ] Acute spinal cord injury (in the previous month)  (5 Points)  [ ] Prothrombin 26902 A                                     (3 Points)               [ ] Paralysis  (less than 1 month)                             (5 Points)  [ ] Factor V Leiden                                             (3 Points)                  [ ] Multiple Trauma within 1 month                        (5 Points)  [ ] Lupus anticoagulants                                     (3 Points)                                                           [ ] Anticardiolipin antibodies                               (3 Points)                                                       [ ] High homocysteine in the blood                      (3 Points)                                             [ ] Other congenital or acquired thrombophilia      (3 Points)                                                [ ] Heparin induced thrombocytopenia                  (3 Points)                                     Total Score [    4     ]

## 2019-02-12 ENCOUNTER — TRANSCRIPTION ENCOUNTER (OUTPATIENT)
Age: 59
End: 2019-02-12

## 2019-02-13 ENCOUNTER — APPOINTMENT (OUTPATIENT)
Dept: ORTHOPEDIC SURGERY | Facility: HOSPITAL | Age: 59
End: 2019-02-13

## 2019-02-13 ENCOUNTER — RESULT REVIEW (OUTPATIENT)
Age: 59
End: 2019-02-13

## 2019-02-13 ENCOUNTER — INPATIENT (INPATIENT)
Facility: HOSPITAL | Age: 59
LOS: 1 days | Discharge: HOME | DRG: 518 | End: 2019-02-15
Attending: ORTHOPAEDIC SURGERY | Admitting: ORTHOPAEDIC SURGERY
Payer: COMMERCIAL

## 2019-02-13 VITALS
HEART RATE: 71 BPM | SYSTOLIC BLOOD PRESSURE: 135 MMHG | HEIGHT: 74 IN | RESPIRATION RATE: 18 BRPM | TEMPERATURE: 99 F | WEIGHT: 251.11 LBS | DIASTOLIC BLOOD PRESSURE: 79 MMHG

## 2019-02-13 DIAGNOSIS — M43.22 FUSION OF SPINE, CERVICAL REGION: Chronic | ICD-10-CM

## 2019-02-13 DIAGNOSIS — G83.4 CAUDA EQUINA SYNDROME: ICD-10-CM

## 2019-02-13 DIAGNOSIS — Z98.890 OTHER SPECIFIED POSTPROCEDURAL STATES: Chronic | ICD-10-CM

## 2019-02-13 LAB
ANION GAP SERPL CALC-SCNC: 14 MMOL/L — SIGNIFICANT CHANGE UP (ref 5–17)
BUN SERPL-MCNC: 12 MG/DL — SIGNIFICANT CHANGE UP (ref 7–23)
CALCIUM SERPL-MCNC: 8.9 MG/DL — SIGNIFICANT CHANGE UP (ref 8.4–10.5)
CHLORIDE SERPL-SCNC: 100 MMOL/L — SIGNIFICANT CHANGE UP (ref 96–108)
CO2 SERPL-SCNC: 22 MMOL/L — SIGNIFICANT CHANGE UP (ref 22–31)
CREAT SERPL-MCNC: 1.14 MG/DL — SIGNIFICANT CHANGE UP (ref 0.5–1.3)
GLUCOSE SERPL-MCNC: 197 MG/DL — HIGH (ref 70–99)
HCT VFR BLD CALC: 36.1 % — LOW (ref 39–50)
HGB BLD-MCNC: 13.2 G/DL — SIGNIFICANT CHANGE UP (ref 13–17)
MCHC RBC-ENTMCNC: 33.1 PG — SIGNIFICANT CHANGE UP (ref 27–34)
MCHC RBC-ENTMCNC: 36.5 GM/DL — HIGH (ref 32–36)
MCV RBC AUTO: 90.8 FL — SIGNIFICANT CHANGE UP (ref 80–100)
PLATELET # BLD AUTO: 116 K/UL — LOW (ref 150–400)
POTASSIUM SERPL-MCNC: 4.7 MMOL/L — SIGNIFICANT CHANGE UP (ref 3.5–5.3)
POTASSIUM SERPL-SCNC: 4.7 MMOL/L — SIGNIFICANT CHANGE UP (ref 3.5–5.3)
RBC # BLD: 3.98 M/UL — LOW (ref 4.2–5.8)
RBC # FLD: 12.3 % — SIGNIFICANT CHANGE UP (ref 10.3–14.5)
SODIUM SERPL-SCNC: 136 MMOL/L — SIGNIFICANT CHANGE UP (ref 135–145)
WBC # BLD: 9.5 K/UL — SIGNIFICANT CHANGE UP (ref 3.8–10.5)
WBC # FLD AUTO: 9.5 K/UL — SIGNIFICANT CHANGE UP (ref 3.8–10.5)

## 2019-02-13 PROCEDURE — 63047 LAM FACETEC & FORAMOT LUMBAR: CPT | Mod: 59

## 2019-02-13 PROCEDURE — 63047 LAM FACETEC & FORAMOT LUMBAR: CPT | Mod: AS,59

## 2019-02-13 PROCEDURE — 63267 EXCISE INTRSPINL LESION LMBR: CPT | Mod: AS,59

## 2019-02-13 PROCEDURE — 63005 REMOVE SPINE LAMINA 1/2 LMBR: CPT | Mod: AS

## 2019-02-13 PROCEDURE — 63267 EXCISE INTRSPINL LESION LMBR: CPT

## 2019-02-13 PROCEDURE — 88304 TISSUE EXAM BY PATHOLOGIST: CPT | Mod: 26

## 2019-02-13 PROCEDURE — 88311 DECALCIFY TISSUE: CPT | Mod: 26

## 2019-02-13 PROCEDURE — 72020 X-RAY EXAM OF SPINE 1 VIEW: CPT | Mod: 26

## 2019-02-13 RX ORDER — ACETAMINOPHEN 500 MG
975 TABLET ORAL EVERY 8 HOURS
Qty: 0 | Refills: 0 | Status: DISCONTINUED | OUTPATIENT
Start: 2019-02-13 | End: 2019-02-13

## 2019-02-13 RX ORDER — SODIUM CHLORIDE 9 MG/ML
1000 INJECTION, SOLUTION INTRAVENOUS
Qty: 0 | Refills: 0 | Status: DISCONTINUED | OUTPATIENT
Start: 2019-02-13 | End: 2019-02-15

## 2019-02-13 RX ORDER — HYDROMORPHONE HYDROCHLORIDE 2 MG/ML
2 INJECTION INTRAMUSCULAR; INTRAVENOUS; SUBCUTANEOUS
Qty: 0 | Refills: 0 | Status: DISCONTINUED | OUTPATIENT
Start: 2019-02-13 | End: 2019-02-15

## 2019-02-13 RX ORDER — ACETAMINOPHEN 500 MG
1000 TABLET ORAL ONCE
Qty: 0 | Refills: 0 | Status: COMPLETED | OUTPATIENT
Start: 2019-02-13 | End: 2019-02-13

## 2019-02-13 RX ORDER — OXYCODONE HYDROCHLORIDE 5 MG/1
5 TABLET ORAL EVERY 4 HOURS
Qty: 0 | Refills: 0 | Status: DISCONTINUED | OUTPATIENT
Start: 2019-02-13 | End: 2019-02-13

## 2019-02-13 RX ORDER — DIAZEPAM 5 MG
5 TABLET ORAL EVERY 8 HOURS
Qty: 0 | Refills: 0 | Status: DISCONTINUED | OUTPATIENT
Start: 2019-02-13 | End: 2019-02-15

## 2019-02-13 RX ORDER — HYDROMORPHONE HYDROCHLORIDE 2 MG/ML
4 INJECTION INTRAMUSCULAR; INTRAVENOUS; SUBCUTANEOUS
Qty: 0 | Refills: 0 | Status: DISCONTINUED | OUTPATIENT
Start: 2019-02-13 | End: 2019-02-15

## 2019-02-13 RX ORDER — DOCUSATE SODIUM 100 MG
100 CAPSULE ORAL THREE TIMES A DAY
Qty: 0 | Refills: 0 | Status: DISCONTINUED | OUTPATIENT
Start: 2019-02-13 | End: 2019-02-15

## 2019-02-13 RX ORDER — ZOLPIDEM TARTRATE 10 MG/1
5 TABLET ORAL AT BEDTIME
Qty: 0 | Refills: 0 | Status: DISCONTINUED | OUTPATIENT
Start: 2019-02-13 | End: 2019-02-15

## 2019-02-13 RX ORDER — ACETAMINOPHEN 500 MG
650 TABLET ORAL EVERY 6 HOURS
Qty: 0 | Refills: 0 | Status: DISCONTINUED | OUTPATIENT
Start: 2019-02-13 | End: 2019-02-15

## 2019-02-13 RX ORDER — HYDROMORPHONE HYDROCHLORIDE 2 MG/ML
30 INJECTION INTRAMUSCULAR; INTRAVENOUS; SUBCUTANEOUS
Qty: 0 | Refills: 0 | Status: DISCONTINUED | OUTPATIENT
Start: 2019-02-13 | End: 2019-02-13

## 2019-02-13 RX ORDER — ONDANSETRON 8 MG/1
4 TABLET, FILM COATED ORAL ONCE
Qty: 0 | Refills: 0 | Status: DISCONTINUED | OUTPATIENT
Start: 2019-02-13 | End: 2019-02-13

## 2019-02-13 RX ORDER — METOPROLOL TARTRATE 50 MG
50 TABLET ORAL
Qty: 0 | Refills: 0 | Status: DISCONTINUED | OUTPATIENT
Start: 2019-02-13 | End: 2019-02-15

## 2019-02-13 RX ORDER — HYDROMORPHONE HYDROCHLORIDE 2 MG/ML
0.5 INJECTION INTRAMUSCULAR; INTRAVENOUS; SUBCUTANEOUS
Qty: 0 | Refills: 0 | Status: DISCONTINUED | OUTPATIENT
Start: 2019-02-13 | End: 2019-02-13

## 2019-02-13 RX ORDER — CEFAZOLIN SODIUM 1 G
2000 VIAL (EA) INJECTION EVERY 8 HOURS
Qty: 0 | Refills: 0 | Status: COMPLETED | OUTPATIENT
Start: 2019-02-13 | End: 2019-02-13

## 2019-02-13 RX ORDER — SENNA PLUS 8.6 MG/1
2 TABLET ORAL AT BEDTIME
Qty: 0 | Refills: 0 | Status: DISCONTINUED | OUTPATIENT
Start: 2019-02-13 | End: 2019-02-15

## 2019-02-13 RX ORDER — SODIUM CHLORIDE 9 MG/ML
500 INJECTION INTRAMUSCULAR; INTRAVENOUS; SUBCUTANEOUS ONCE
Qty: 0 | Refills: 0 | Status: COMPLETED | OUTPATIENT
Start: 2019-02-14 | End: 2019-02-14

## 2019-02-13 RX ORDER — DEXAMETHASONE 0.5 MG/5ML
5 ELIXIR ORAL EVERY 6 HOURS
Qty: 0 | Refills: 0 | Status: COMPLETED | OUTPATIENT
Start: 2019-02-13 | End: 2019-02-14

## 2019-02-13 RX ORDER — DEXAMETHASONE 0.5 MG/5ML
ELIXIR ORAL
Qty: 0 | Refills: 0 | Status: CANCELLED | OUTPATIENT
Start: 2019-02-14 | End: 2019-02-15

## 2019-02-13 RX ORDER — SODIUM CHLORIDE 9 MG/ML
500 INJECTION INTRAMUSCULAR; INTRAVENOUS; SUBCUTANEOUS ONCE
Qty: 0 | Refills: 0 | Status: COMPLETED | OUTPATIENT
Start: 2019-02-13 | End: 2019-02-13

## 2019-02-13 RX ORDER — ATORVASTATIN CALCIUM 80 MG/1
20 TABLET, FILM COATED ORAL AT BEDTIME
Qty: 0 | Refills: 0 | Status: DISCONTINUED | OUTPATIENT
Start: 2019-02-13 | End: 2019-02-15

## 2019-02-13 RX ORDER — NALOXONE HYDROCHLORIDE 4 MG/.1ML
0.1 SPRAY NASAL
Qty: 0 | Refills: 0 | Status: DISCONTINUED | OUTPATIENT
Start: 2019-02-13 | End: 2019-02-13

## 2019-02-13 RX ORDER — FAMOTIDINE 10 MG/ML
20 INJECTION INTRAVENOUS
Qty: 0 | Refills: 0 | Status: DISCONTINUED | OUTPATIENT
Start: 2019-02-13 | End: 2019-02-15

## 2019-02-13 RX ORDER — OXYCODONE HYDROCHLORIDE 5 MG/1
10 TABLET ORAL EVERY 4 HOURS
Qty: 0 | Refills: 0 | Status: DISCONTINUED | OUTPATIENT
Start: 2019-02-13 | End: 2019-02-13

## 2019-02-13 RX ORDER — HYDROMORPHONE HYDROCHLORIDE 2 MG/ML
1 INJECTION INTRAMUSCULAR; INTRAVENOUS; SUBCUTANEOUS EVERY 4 HOURS
Qty: 0 | Refills: 0 | Status: DISCONTINUED | OUTPATIENT
Start: 2019-02-13 | End: 2019-02-15

## 2019-02-13 RX ORDER — HYDROMORPHONE HYDROCHLORIDE 2 MG/ML
0.5 INJECTION INTRAMUSCULAR; INTRAVENOUS; SUBCUTANEOUS EVERY 4 HOURS
Qty: 0 | Refills: 0 | Status: DISCONTINUED | OUTPATIENT
Start: 2019-02-13 | End: 2019-02-13

## 2019-02-13 RX ORDER — DEXAMETHASONE 0.5 MG/5ML
3 ELIXIR ORAL EVERY 6 HOURS
Qty: 0 | Refills: 0 | Status: DISCONTINUED | OUTPATIENT
Start: 2019-02-14 | End: 2019-02-15

## 2019-02-13 RX ORDER — ONDANSETRON 8 MG/1
4 TABLET, FILM COATED ORAL EVERY 6 HOURS
Qty: 0 | Refills: 0 | Status: DISCONTINUED | OUTPATIENT
Start: 2019-02-13 | End: 2019-02-13

## 2019-02-13 RX ORDER — DEXAMETHASONE 0.5 MG/5ML
1 ELIXIR ORAL EVERY 6 HOURS
Qty: 0 | Refills: 0 | Status: DISCONTINUED | OUTPATIENT
Start: 2019-02-15 | End: 2019-02-15

## 2019-02-13 RX ADMIN — Medication 1000 MILLIGRAM(S): at 23:09

## 2019-02-13 RX ADMIN — Medication 100 MILLIGRAM(S): at 16:55

## 2019-02-13 RX ADMIN — FAMOTIDINE 20 MILLIGRAM(S): 10 INJECTION INTRAVENOUS at 17:02

## 2019-02-13 RX ADMIN — SODIUM CHLORIDE 100 MILLILITER(S): 9 INJECTION, SOLUTION INTRAVENOUS at 13:00

## 2019-02-13 RX ADMIN — Medication 100 MILLIGRAM(S): at 22:40

## 2019-02-13 RX ADMIN — HYDROMORPHONE HYDROCHLORIDE 1 MILLIGRAM(S): 2 INJECTION INTRAMUSCULAR; INTRAVENOUS; SUBCUTANEOUS at 17:15

## 2019-02-13 RX ADMIN — Medication 50 MILLIGRAM(S): at 18:33

## 2019-02-13 RX ADMIN — Medication 400 MILLIGRAM(S): at 22:39

## 2019-02-13 RX ADMIN — Medication 5 MILLIGRAM(S): at 23:38

## 2019-02-13 RX ADMIN — HYDROMORPHONE HYDROCHLORIDE 0.5 MILLIGRAM(S): 2 INJECTION INTRAMUSCULAR; INTRAVENOUS; SUBCUTANEOUS at 14:20

## 2019-02-13 RX ADMIN — Medication 5 MILLIGRAM(S): at 13:58

## 2019-02-13 RX ADMIN — HYDROMORPHONE HYDROCHLORIDE 4 MILLIGRAM(S): 2 INJECTION INTRAMUSCULAR; INTRAVENOUS; SUBCUTANEOUS at 23:37

## 2019-02-13 RX ADMIN — Medication 100 MILLIGRAM(S): at 13:59

## 2019-02-13 RX ADMIN — Medication 5 MILLIGRAM(S): at 22:40

## 2019-02-13 RX ADMIN — ATORVASTATIN CALCIUM 20 MILLIGRAM(S): 80 TABLET, FILM COATED ORAL at 22:40

## 2019-02-13 RX ADMIN — SODIUM CHLORIDE 1000 MILLILITER(S): 9 INJECTION INTRAMUSCULAR; INTRAVENOUS; SUBCUTANEOUS at 16:55

## 2019-02-13 RX ADMIN — HYDROMORPHONE HYDROCHLORIDE 4 MILLIGRAM(S): 2 INJECTION INTRAMUSCULAR; INTRAVENOUS; SUBCUTANEOUS at 20:51

## 2019-02-13 RX ADMIN — SENNA PLUS 2 TABLET(S): 8.6 TABLET ORAL at 22:40

## 2019-02-13 RX ADMIN — HYDROMORPHONE HYDROCHLORIDE 1 MILLIGRAM(S): 2 INJECTION INTRAMUSCULAR; INTRAVENOUS; SUBCUTANEOUS at 16:55

## 2019-02-13 RX ADMIN — HYDROMORPHONE HYDROCHLORIDE 0.5 MILLIGRAM(S): 2 INJECTION INTRAMUSCULAR; INTRAVENOUS; SUBCUTANEOUS at 13:59

## 2019-02-13 RX ADMIN — HYDROMORPHONE HYDROCHLORIDE 4 MILLIGRAM(S): 2 INJECTION INTRAMUSCULAR; INTRAVENOUS; SUBCUTANEOUS at 20:21

## 2019-02-13 RX ADMIN — Medication 100 MILLIGRAM(S): at 23:37
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
## 2019-02-13 NOTE — CONSULT NOTE ADULT - ASSESSMENT
Patient is a 58y old  Male who presents with a chief complaint of "lower back pain" (07 Feb 2019 09:57).    S/p L3-5 posterior lumbar laminectomy and discectomy:  Pain control with IV and PO Dilaudid  Ortho spine f/up noted.    HTN:  Metorprolol    HLD:  Lipitor

## 2019-02-13 NOTE — PHYSICAL THERAPY INITIAL EVALUATION ADULT - PLANNED THERAPY INTERVENTIONS, PT EVAL
gait training/strengthening/Stair negotiation: In 2 weeks, pt will asc/desc 1 flight of stairs no assistive device./transfer training

## 2019-02-13 NOTE — PHYSICAL THERAPY INITIAL EVALUATION ADULT - ADDITIONAL COMMENTS
PLOF: pt resides with significant other in [facility] with no YARIEL and 12 stairs inside with one handrail. Pt was independent with all ADLs and amb, denied using AD.

## 2019-02-13 NOTE — PATIENT PROFILE ADULT - NSTOBACCOQUITATTEMPT_GEN_A_CORE_SD
Test Reason : 

Blood Pressure : ***/*** mmHG

Vent. Rate : 079 BPM     Atrial Rate : 079 BPM

   P-R Int : 126 ms          QRS Dur : 084 ms

    QT Int : 382 ms       P-R-T Axes : 025 010 019 degrees

   QTc Int : 438 ms

 

NORMAL SINUS RHYTHM

NORMAL ECG

WHEN COMPARED WITH ECG OF 10-NOV-2013 07:40,

NO SIGNIFICANT CHANGE WAS FOUND

Confirmed by URIEL GARAY MD ([id number]) on 7/16/2017 1:56:50 PM

 

Referred By:             Confirmed By:URIEL GARAY MD
none

## 2019-02-13 NOTE — CHART NOTE - NSCHARTNOTEFT_GEN_A_CORE
Patient c/o incisional pain and  B/L parenthesias   No Chest Pain, SOB, N/V.    Pre op s/sx : BLE radiculopathy rad -> legs   ; Post op, patient reports:  mildly improved .    Exam:   Alert/Oriented, No Acute Distress  Cards: +S1/S2, RRR  Pulm: CTAB  BACK:          Dressing: [x ] clean/dry/intact  [ ] Other:           Drains: : HV: 75cc         Sensation: [ ] intact to light touch  [x ] decreased B feet (digits)         Motor exam: [ x ]                          [ ] Lower extremity                      PF          DF         EHL       FHL                                                                                            R        5/5        5/5        5/5       5/5                                                        L         4/5        4/5        4/5       4/5                                                                  Calves Soft/Non-tender bilaterally           [ ] warm well perfused; capillary refill <3 seconds              LABS:                        13.2   9.5   )-----------( 116<L>    ( 13 Feb 2019 12:32 )             36.1<L>    02-13    136  |  100  |  12  ----------------------------<  197<H>  4.7   |  22  |  1.14        RADIOLOGY & ADDITIONAL STUDIES:      A/P :  58y Male s/p Lumbar laminectomy  Discectomy, spine, lumbar, posterior approach  ;   levels: C1-C7, L1-S1   -    Pain control  -    Taper  -    DVT ppx: SCDs       -    Physical Therapy  -    Weight bearing status: WBAT [ ]        PWB    [ ]     TTWB  [ ]      NWB  [ ]  -    Dispo: Home [ ]     Rehab [ ]      RICHY [ ]      To be determined [ ] Patient c/o incisional pain and  B/L parenthesias   No Chest Pain, SOB, N/V.    Pre op s/sx : BLE radiculopathy rad -> legs   ; Post op, patient reports:  mildly improved .    Exam:   Alert/Oriented, No Acute Distress  Cards: +S1/S2, RRR  Pulm: CTAB  BACK:          Dressing: [x ] clean/dry/intact  [ ] Other:           Drains: : HV: 75cc         Sensation: [ ] intact to light touch  [x ] decreased B feet (digits)         Motor exam: [ x ]                          [ ] Lower extremity                       PF          DF         EHL       FHL                                                                                            R        4+/5        4/5        4+/5       4+/5                                                        L         4/5        4/5        4/5       4/5                                                                  Calves Soft/Non-tender bilaterally           [ x] warm well perfused; capillary refill <3 seconds              LABS:                        13.2   9.5   )-----------( 116<L>    ( 13 Feb 2019 12:32 )             36.1<L>    02-13    136  |  100  |  12  ----------------------------<  197<H>  4.7   |  22  |  1.14        RADIOLOGY & ADDITIONAL STUDIES:      A/P :  58y Male s/p Lumbar laminectomy  Discectomy, L3-5    -    Pain control  -    Taper  -    Continue HMV  -    DVT ppx: SCDs       -    Physical Therapy  -    Weight bearing status: WBAT [x ]        PWB    [ ]     TTWB  [ ]      NWB  [ ]  -    Dispo: Home [x ]     Rehab [ ]      RICHY [ ]      To be determined [ ]      ***See Above  Shahab KRUEGER  Orthopedics  B: [phone]  S: [phone]

## 2019-02-13 NOTE — BRIEF OPERATIVE NOTE - POST-OP DX
HNP (herniated nucleus pulposus), lumbar  02/13/2019  L4-L5  Active  Denver Calderon  Spinal stenosis of lumbar region, unspecified whether neurogenic claudication present  02/13/2019    Active  Denver Calderon

## 2019-02-13 NOTE — BRIEF OPERATIVE NOTE - PROCEDURE
<<-----Click on this checkbox to enter Procedure Discectomy, spine, lumbar, posterior approach  02/13/2019  L4-L5  Active  [id number]  Lumbar laminectomy  02/13/2019  L3-L5  Active  [id number]

## 2019-02-13 NOTE — PHYSICAL THERAPY INITIAL EVALUATION ADULT - STRENGTHENING, PT EVAL
GOAL: Pt will improve BUE/BLE strength by 1/2 grade in 2wks to improve overall functional mobility and return to PLOF.

## 2019-02-13 NOTE — BRIEF OPERATIVE NOTE - PRE-OP DX
HNP (herniated nucleus pulposus), lumbar  02/13/2019  L3-L5  Active  Denver Calderon  Spinal stenosis of lumbar region, unspecified whether neurogenic claudication present  02/13/2019  L3-L5  Active  Denver Calderon
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
## 2019-02-14 ENCOUNTER — TRANSCRIPTION ENCOUNTER (OUTPATIENT)
Age: 59
End: 2019-02-14

## 2019-02-14 LAB
ANION GAP SERPL CALC-SCNC: 13 MMOL/L — SIGNIFICANT CHANGE UP (ref 5–17)
BUN SERPL-MCNC: 9 MG/DL — SIGNIFICANT CHANGE UP (ref 7–23)
CALCIUM SERPL-MCNC: 9 MG/DL — SIGNIFICANT CHANGE UP (ref 8.4–10.5)
CHLORIDE SERPL-SCNC: 101 MMOL/L — SIGNIFICANT CHANGE UP (ref 96–108)
CO2 SERPL-SCNC: 24 MMOL/L — SIGNIFICANT CHANGE UP (ref 22–31)
CREAT SERPL-MCNC: 0.83 MG/DL — SIGNIFICANT CHANGE UP (ref 0.5–1.3)
GLUCOSE SERPL-MCNC: 177 MG/DL — HIGH (ref 70–99)
HCT VFR BLD CALC: 36.4 % — LOW (ref 39–50)
HGB BLD-MCNC: 12 G/DL — LOW (ref 13–17)
MCHC RBC-ENTMCNC: 30.5 PG — SIGNIFICANT CHANGE UP (ref 27–34)
MCHC RBC-ENTMCNC: 33 GM/DL — SIGNIFICANT CHANGE UP (ref 32–36)
MCV RBC AUTO: 92.4 FL — SIGNIFICANT CHANGE UP (ref 80–100)
PLATELET # BLD AUTO: 132 K/UL — LOW (ref 150–400)
POTASSIUM SERPL-MCNC: 4.5 MMOL/L — SIGNIFICANT CHANGE UP (ref 3.5–5.3)
POTASSIUM SERPL-SCNC: 4.5 MMOL/L — SIGNIFICANT CHANGE UP (ref 3.5–5.3)
RBC # BLD: 3.94 M/UL — LOW (ref 4.2–5.8)
RBC # FLD: 13.4 % — SIGNIFICANT CHANGE UP (ref 10.3–14.5)
SODIUM SERPL-SCNC: 138 MMOL/L — SIGNIFICANT CHANGE UP (ref 135–145)
WBC # BLD: 13.06 K/UL — HIGH (ref 3.8–10.5)
WBC # FLD AUTO: 13.06 K/UL — HIGH (ref 3.8–10.5)

## 2019-02-14 RX ORDER — NIACIN 50 MG
1 TABLET ORAL
Qty: 0 | Refills: 0 | COMMUNITY

## 2019-02-14 RX ORDER — MILK THISTLE 180 MG
1 CAPSULE ORAL
Qty: 0 | Refills: 0 | COMMUNITY

## 2019-02-14 RX ORDER — ACETAMINOPHEN 500 MG
2 TABLET ORAL
Qty: 0 | Refills: 0 | COMMUNITY
Start: 2019-02-14

## 2019-02-14 RX ORDER — SENNA PLUS 8.6 MG/1
2 TABLET ORAL
Qty: 0 | Refills: 0 | COMMUNITY
Start: 2019-02-14

## 2019-02-14 RX ORDER — OMEGA-3 ACID ETHYL ESTERS 1 G
1 CAPSULE ORAL
Qty: 0 | Refills: 0 | COMMUNITY

## 2019-02-14 RX ORDER — ASCORBIC ACID 60 MG
1 TABLET,CHEWABLE ORAL
Qty: 0 | Refills: 0 | COMMUNITY

## 2019-02-14 RX ORDER — FAMOTIDINE 10 MG/ML
1 INJECTION INTRAVENOUS
Qty: 0 | Refills: 0 | COMMUNITY
Start: 2019-02-14

## 2019-02-14 RX ORDER — DOCUSATE SODIUM 100 MG
1 CAPSULE ORAL
Qty: 0 | Refills: 0 | COMMUNITY
Start: 2019-02-14

## 2019-02-14 RX ADMIN — HYDROMORPHONE HYDROCHLORIDE 4 MILLIGRAM(S): 2 INJECTION INTRAMUSCULAR; INTRAVENOUS; SUBCUTANEOUS at 00:09

## 2019-02-14 RX ADMIN — FAMOTIDINE 20 MILLIGRAM(S): 10 INJECTION INTRAVENOUS at 18:07

## 2019-02-14 RX ADMIN — SODIUM CHLORIDE 1000 MILLILITER(S): 9 INJECTION INTRAMUSCULAR; INTRAVENOUS; SUBCUTANEOUS at 07:55

## 2019-02-14 RX ADMIN — ATORVASTATIN CALCIUM 20 MILLIGRAM(S): 80 TABLET, FILM COATED ORAL at 21:40

## 2019-02-14 RX ADMIN — FAMOTIDINE 20 MILLIGRAM(S): 10 INJECTION INTRAVENOUS at 05:39

## 2019-02-14 RX ADMIN — HYDROMORPHONE HYDROCHLORIDE 4 MILLIGRAM(S): 2 INJECTION INTRAMUSCULAR; INTRAVENOUS; SUBCUTANEOUS at 11:52

## 2019-02-14 RX ADMIN — HYDROMORPHONE HYDROCHLORIDE 4 MILLIGRAM(S): 2 INJECTION INTRAMUSCULAR; INTRAVENOUS; SUBCUTANEOUS at 12:20

## 2019-02-14 RX ADMIN — Medication 5 MILLIGRAM(S): at 05:40

## 2019-02-14 RX ADMIN — Medication 100 MILLIGRAM(S): at 21:40

## 2019-02-14 RX ADMIN — HYDROMORPHONE HYDROCHLORIDE 4 MILLIGRAM(S): 2 INJECTION INTRAMUSCULAR; INTRAVENOUS; SUBCUTANEOUS at 06:09

## 2019-02-14 RX ADMIN — HYDROMORPHONE HYDROCHLORIDE 4 MILLIGRAM(S): 2 INJECTION INTRAMUSCULAR; INTRAVENOUS; SUBCUTANEOUS at 18:38

## 2019-02-14 RX ADMIN — HYDROMORPHONE HYDROCHLORIDE 4 MILLIGRAM(S): 2 INJECTION INTRAMUSCULAR; INTRAVENOUS; SUBCUTANEOUS at 05:39

## 2019-02-14 RX ADMIN — Medication 100 MILLIGRAM(S): at 05:39

## 2019-02-14 RX ADMIN — Medication 1 TABLET(S): at 11:52

## 2019-02-14 RX ADMIN — SENNA PLUS 2 TABLET(S): 8.6 TABLET ORAL at 21:40

## 2019-02-14 RX ADMIN — Medication 50 MILLIGRAM(S): at 05:39

## 2019-02-14 RX ADMIN — HYDROMORPHONE HYDROCHLORIDE 4 MILLIGRAM(S): 2 INJECTION INTRAMUSCULAR; INTRAVENOUS; SUBCUTANEOUS at 18:07

## 2019-02-14 RX ADMIN — Medication 3 MILLIGRAM(S): at 21:40

## 2019-02-14 RX ADMIN — HYDROMORPHONE HYDROCHLORIDE 4 MILLIGRAM(S): 2 INJECTION INTRAMUSCULAR; INTRAVENOUS; SUBCUTANEOUS at 09:00

## 2019-02-14 RX ADMIN — HYDROMORPHONE HYDROCHLORIDE 4 MILLIGRAM(S): 2 INJECTION INTRAMUSCULAR; INTRAVENOUS; SUBCUTANEOUS at 08:33

## 2019-02-14 RX ADMIN — Medication 100 MILLIGRAM(S): at 11:52

## 2019-02-14 RX ADMIN — HYDROMORPHONE HYDROCHLORIDE 4 MILLIGRAM(S): 2 INJECTION INTRAMUSCULAR; INTRAVENOUS; SUBCUTANEOUS at 22:10

## 2019-02-14 RX ADMIN — Medication 5 MILLIGRAM(S): at 11:51

## 2019-02-14 RX ADMIN — HYDROMORPHONE HYDROCHLORIDE 4 MILLIGRAM(S): 2 INJECTION INTRAMUSCULAR; INTRAVENOUS; SUBCUTANEOUS at 21:40

## 2019-02-14 RX ADMIN — Medication 50 MILLIGRAM(S): at 18:07

## 2019-02-14 NOTE — PROGRESS NOTE ADULT - SUBJECTIVE AND OBJECTIVE BOX
Orthopaedic Surgery Progress Note    Pt reports pain is well controlled. No acute overnight events. Denies fevers, dizziness, CP, SOB, N/V, numbness/tingling, weakness, calf pain.    Labs:                        13.2   9.5   )-----------( 116      ( 13 Feb 2019 12:32 )             36.1     02-14    138  |  101  |  9   ----------------------------<  177<H>  4.5   |  24  |  0.83    Ca    9.0      14 Feb 2019 06:02    Vitals:  T(C): 36.6 (02-14-19 @ 05:24), Max: 37.1 (02-13-19 @ 16:00)  HR: 71 (02-14-19 @ 05:24) (66 - 90)  BP: 112/67 (02-14-19 @ 05:24) (100/56 - 135/79)  RR: 18 (02-14-19 @ 05:24) (15 - 19)  SpO2: 92% (02-14-19 @ 05:24) (92% - 98%)    Physical Exam:  Gen: NAD, resting comfortably    Spine Exam:  Dressing clean, dry, intact  Negative clonus  Negative babinski  Negative andujar  Negative ulnar drift  No calf TTP    Motor:               C5           C6            C7               C8           T1   R         5/5          5/5            5/5             5/5          5/5  L          5/5          5/5            5/5             5/5          5/5                L2             L3             L4               L5            S1  R         5/5           5/5          5/5             5/5           5/5  L          5/5          5/5           5/5             5/5           5/5    Sensory:            C5         C6         C7      C8       T1        (0=absent, 1=impaired, 2=normal, NT=not testable)  R         2            2           2        2         2  L          2            2           2        2         2               L2          L3         L4      L5       S1         (0=absent, 1=impaired, 2=normal, NT=not testable)  R         2            1            1        1        1  L          2            1           1       1        1

## 2019-02-14 NOTE — DISCHARGE NOTE ADULT - CARE PLAN
Principal Discharge DX:	Acute bilateral low back pain, with sciatica presence unspecified  Goal:	improved ambulation and pain control  Assessment and plan of treatment:	Weight bearing as tolerated  Keep dressing/incision clean and dry   ice to affected incision every 4-6 hours x 72 hours Principal Discharge DX:	Acute bilateral low back pain, with sciatica presence unspecified  Goal:	improved ambulation and pain control  Assessment and plan of treatment:	Weight bearing as tolerated.  Outpatient PT as per Dr Contreras.  To be discussed at f/u visist  Keep dressing/incision clean and dry   Please read Dr Contreras's post-op instructions for further information

## 2019-02-14 NOTE — PROGRESS NOTE ADULT - ASSESSMENT
Patient is a 58y old  Male who presents with a chief complaint of "lower back pain" (07 Feb 2019 09:57).    S/p L3-5 posterior lumbar laminectomy and discectomy:  Pain control with IV and PO Dilaudid  Ortho spine f/up noted.    HTN:  Metorprolol    HLD:  Lipitor    Leukocytosis/Thrombocytopenia:  Will monitor.

## 2019-02-14 NOTE — PROGRESS NOTE ADULT - ASSESSMENT
Pt is a 58y Male POD 1 from L3-5 Lami/L4-5 Disc.  -Pain Control  -Hold all chemical DVT PPx in setting of spinal surgery  -SCDs  -Monitor Drain output  -Steroid taper  -Encourage Incentive Spirometry  -WBAT  -PT/OT  -Med Management  -Dispo Planning  -Will discuss with attending and advise if plan changes    Alyson Ruiz M.D.  PGY-1 Orthopaedic Surgery

## 2019-02-14 NOTE — DISCHARGE NOTE ADULT - CARE PROVIDERS DIRECT ADDRESSES
[email or web address]

## 2019-02-14 NOTE — DISCHARGE NOTE ADULT - MEDICATION SUMMARY - MEDICATIONS TO STOP TAKING
I will STOP taking the medications listed below when I get home from the hospital:    Flax Seed Oil oral capsule  -- 1 cap(s) by mouth once a day    Green Coffee Bean  -- 1 tab(s) by mouth 2 times a day    Milk Thistle oral tablet  -- 1 tab(s) by mouth once a day    niacin oral tablet, extended release  -- 1 tab(s) by mouth once a day    Carb Controller  -- 1 tab(s) by mouth once a day

## 2019-02-14 NOTE — DISCHARGE NOTE ADULT - CARE PROVIDER_API CALL
Momo Contreras)  Orthopedics  [address]  [place] [address]  Phone: [phone]  Fax: [phone]  Follow Up Time:

## 2019-02-14 NOTE — DISCHARGE NOTE ADULT - ADDITIONAL INSTRUCTIONS
complete steroid taper as prescribed  Please see Dr Contreras's ATTACHED Instruction Sheet   Follow up Dr Contreras in 10-14 days re: wound check  Follow up with your private internist / PMD in 4-6 weeks re: general checkup (and possible medication adjustment)   Please call for an appointment Please see Dr Contreras's ATTACHED Instruction Sheet   Follow up Dr Contreras in 7 -10 days re: wound check  Follow up with your private internist / PMD in 4-6 weeks re: general checkup (and possible medication adjustment)

## 2019-02-14 NOTE — PROGRESS NOTE ADULT - SUBJECTIVE AND OBJECTIVE BOX
Patient is a 58y old  Male who presents with a chief complaint of "lower back pain" (07 Feb 2019 09:57)      SUBJECTIVE / OVERNIGHT EVENTS:    Events noted.  C/o pain at surgical site  RESPIRATORY: No cough, wheezing, chills or hemoptysis; No shortness of breath  CARDIOVASCULAR: No chest pain, palpitations, dizziness, or leg swelling  GASTROINTESTINAL: No abdominal or epigastric pain. No nausea, vomiting, or hematemesis; No diarrhea or constipation.   NEUROLOGICAL: No headaches,     MEDICATIONS  (STANDING):  atorvastatin 20 milliGRAM(s) Oral at bedtime  dexamethasone     Tablet 3 milliGRAM(s) Oral every 6 hours  dexamethasone  Injectable 5 milliGRAM(s) IV Push every 6 hours  docusate sodium 100 milliGRAM(s) Oral three times a day  famotidine    Tablet 20 milliGRAM(s) Oral two times a day  lactated ringers. 1000 milliLiter(s) (100 mL/Hr) IV Continuous <Continuous>  metoprolol tartrate 50 milliGRAM(s) Oral two times a day  multivitamin 1 Tablet(s) Oral daily  senna 2 Tablet(s) Oral at bedtime    MEDICATIONS  (PRN):  acetaminophen   Tablet .. 650 milliGRAM(s) Oral every 6 hours PRN Temp greater or equal to 38C (100.4F)  diazepam    Tablet 5 milliGRAM(s) Oral every 8 hours PRN spasm  HYDROmorphone   Tablet 2 milliGRAM(s) Oral every 3 hours PRN Moderate Pain (4 - 6)  HYDROmorphone   Tablet 4 milliGRAM(s) Oral every 3 hours PRN Severe Pain (7 - 10)  HYDROmorphone  Injectable 1 milliGRAM(s) IV Push every 4 hours PRN breakthrough  zolpidem 5 milliGRAM(s) Oral at bedtime PRN Insomnia        CAPILLARY BLOOD GLUCOSE        I&O's Summary    13 Feb 2019 07:01  -  14 Feb 2019 07:00  --------------------------------------------------------  IN: 1850 mL / OUT: 1855 mL / NET: -5 mL        PHYSICAL EXAM:  GENERAL: NAD  NECK: Supple, No JVD  CHEST/LUNG: Clear to auscultation bilaterally; No wheezing.  HEART: Regular rate and rhythm; No murmurs, rubs, or gallops  ABDOMEN: Soft, Nontender, Nondistended; Bowel sounds present  EXTREMITIES:   No clubbing, cyanosis, or edema  NEUROLOGY: AAO X 3      LABS:                        12.0   13.06 )-----------( 132      ( 14 Feb 2019 07:51 )             36.4     02-14    138  |  101  |  9   ----------------------------<  177<H>  4.5   |  24  |  0.83    Ca    9.0      14 Feb 2019 06:02              CAPILLARY BLOOD GLUCOSE                    RADIOLOGY & ADDITIONAL TESTS:    Imaging Personally Reviewed:    Consultant(s) Notes Reviewed:      Care Discussed with Consultants/Other Providers:

## 2019-02-14 NOTE — DISCHARGE NOTE ADULT - NS AS ACTIVITY OBS
Walking-Outdoors allowed/Stairs allowed/No Heavy lifting/straining/sponge bath only until OK w/ Surgeon  Call MD for excessive pain, persistent fevers, pain NOT relieved by medications.  Do not drive or lift any heavy objects/Bathing allowed/Do not drive or operate machinery/Walking-Indoors allowed

## 2019-02-14 NOTE — DISCHARGE NOTE ADULT - MEDICATION SUMMARY - MEDICATIONS TO TAKE
I will START or STAY ON the medications listed below when I get home from the hospital:    dexamethasone 1 mg oral tablet  -- 1 tab(s) by mouth every 6 hours  x 4 doses  start 1st dose on 2/15 @ 8pm  -- It is very important that you take or use this exactly as directed.  Do not skip doses or discontinue unless directed by your doctor.  Obtain medical advice before taking any non-prescription drugs as some may affect the action of this medication.  Take with food or milk.    -- Indication: For inflammation    acetaminophen 325 mg oral tablet  -- 2 tab(s) by mouth every 6 hours, As needed, Temp greater or equal to 38C (100.4F)  -- Indication: For pain mgt    HYDROmorphone 4 mg oral tablet  -- 1 tab(s) by mouth every 4 hours, As Needed -Severe Pain (7 - 10) MDD:6   -- Indication: For pain mgt    diazePAM 5 mg oral tablet  -- 1 tab(s) by mouth every 8 hours, As needed, spasm MDD:3  -- Indication: For muscle spasms    rosuvastatin 5 mg oral tablet  -- 1 tab(s) by mouth once a day (at bedtime)  -- Indication: For HLD    metoprolol tartrate 50 mg oral tablet  -- 1 tab(s) by mouth 2 times a day  -- Indication: For HTN (hypertension)    famotidine 20 mg oral tablet  -- 1 tab(s) by mouth 2 times a day  Purchase over-the-counter and continue taking while on pain meds to prevent upset stomach.    -- Indication: For GI    docusate sodium 100 mg oral capsule  -- 1 cap(s) by mouth 3 times a day  Purchase over-the-counter Colace 100mg and continue to take three times-a-day to prevent constipation while on pain meds.    -- Indication: For laxative    senna oral tablet  -- 2 tab(s) by mouth once a day (at bedtime)  while on pain medications   -- Indication: For laxative    potassium gluconate 550 mg oral tablet  -- 1 tab(s) by mouth once a day  -- Indication: For supplement    Multiple Vitamins oral tablet  -- 1 tab(s) by mouth once a day  -- Indication: For supplement
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
## 2019-02-14 NOTE — DISCHARGE NOTE ADULT - PATIENT PORTAL LINK FT
You can access the [facility] Patient Portal, offered by [facility], by registering with the following website: [email or web address]

## 2019-02-14 NOTE — DISCHARGE NOTE ADULT - PLAN OF CARE
improved ambulation and pain control Weight bearing as tolerated  Keep dressing/incision clean and dry   ice to affected incision every 4-6 hours x 72 hours Weight bearing as tolerated.  Outpatient PT as per Dr Contreras.  To be discussed at f/u visist  Keep dressing/incision clean and dry   Please read Dr Contreras's post-op instructions for further information

## 2019-02-15 VITALS
HEART RATE: 80 BPM | RESPIRATION RATE: 17 BRPM | TEMPERATURE: 99 F | SYSTOLIC BLOOD PRESSURE: 129 MMHG | OXYGEN SATURATION: 99 % | DIASTOLIC BLOOD PRESSURE: 74 MMHG

## 2019-02-15 PROCEDURE — 88311 DECALCIFY TISSUE: CPT

## 2019-02-15 PROCEDURE — 85027 COMPLETE CBC AUTOMATED: CPT

## 2019-02-15 PROCEDURE — 72020 X-RAY EXAM OF SPINE 1 VIEW: CPT

## 2019-02-15 PROCEDURE — C1889: CPT

## 2019-02-15 PROCEDURE — 97162 PT EVAL MOD COMPLEX 30 MIN: CPT

## 2019-02-15 PROCEDURE — 80048 BASIC METABOLIC PNL TOTAL CA: CPT

## 2019-02-15 PROCEDURE — 88304 TISSUE EXAM BY PATHOLOGIST: CPT

## 2019-02-15 RX ORDER — DIAZEPAM 5 MG
1 TABLET ORAL
Qty: 10 | Refills: 0 | OUTPATIENT
Start: 2019-02-15

## 2019-02-15 RX ORDER — DEXAMETHASONE 0.5 MG/5ML
1 ELIXIR ORAL
Qty: 4 | Refills: 0 | OUTPATIENT
Start: 2019-02-15

## 2019-02-15 RX ORDER — HYDROMORPHONE HYDROCHLORIDE 2 MG/ML
1 INJECTION INTRAMUSCULAR; INTRAVENOUS; SUBCUTANEOUS
Qty: 42 | Refills: 0 | OUTPATIENT
Start: 2019-02-15 | End: 2019-02-21

## 2019-02-15 RX ADMIN — Medication 100 MILLIGRAM(S): at 06:09

## 2019-02-15 RX ADMIN — Medication 3 MILLIGRAM(S): at 07:52

## 2019-02-15 RX ADMIN — Medication 3 MILLIGRAM(S): at 02:31

## 2019-02-15 RX ADMIN — HYDROMORPHONE HYDROCHLORIDE 4 MILLIGRAM(S): 2 INJECTION INTRAMUSCULAR; INTRAVENOUS; SUBCUTANEOUS at 06:09

## 2019-02-15 RX ADMIN — FAMOTIDINE 20 MILLIGRAM(S): 10 INJECTION INTRAVENOUS at 06:09

## 2019-02-15 RX ADMIN — HYDROMORPHONE HYDROCHLORIDE 4 MILLIGRAM(S): 2 INJECTION INTRAMUSCULAR; INTRAVENOUS; SUBCUTANEOUS at 06:39

## 2019-02-15 RX ADMIN — Medication 50 MILLIGRAM(S): at 06:09

## 2019-02-15 NOTE — PROGRESS NOTE ADULT - SUBJECTIVE AND OBJECTIVE BOX
Patient is a 58y old  Male who presents with a chief complaint of "lower back pain"  L4-5 discectomy  L3-5 Laminectomy (14 Feb 2019 17:21)      POST OPERATIVE DAY #:  2  Patient comfortable  No complaints    T(C): 36.9 (02-15-19 @ 04:55), Max: 36.9 (02-14-19 @ 16:41)  HR: 64 (02-15-19 @ 04:55) (64 - 72)  BP: 126/81 (02-15-19 @ 04:55) (113/68 - 135/81)  RR: 18 (02-15-19 @ 04:55) (16 - 18)  SpO2: 96% (02-15-19 @ 04:55) (94% - 96%)  Wt(kg): --    PHYSICAL EXAM:  NAD, Alert  Back: Dressing intact; sensation grossly intact to light touch; (+) Distal Pulses; No Calf tenderness B/L, PAS           Hemovac 50/135                                                         [ ] Lower extremeity                  PF          DF         EHL       FHL                                                                                            R        5/5        5/5        5/5       5/5                                                        L         5/5        5/5        5/5       5/5      LABS:                        12.0   13.06 )-----------( 132      ( 14 Feb 2019 07:51 )             36.4     02-14    138  |  101  |  9   ----------------------------<  177<H>  4.5   |  24  |  0.83    Ca    9.0      14 Feb 2019 06:02

## 2019-02-15 NOTE — PROGRESS NOTE ADULT - ASSESSMENT
S/P  L3-L5 Lami/L4-5 disectomy      Plan    Continue PT WBAT  ?D/C drain will discuss with attending  Possible D/C today       Sissy Fisher PA-C   Beeper    [id number]

## 2019-02-18 ENCOUNTER — MEDICATION RENEWAL (OUTPATIENT)
Age: 59
End: 2019-02-18

## 2019-02-19 LAB — SURGICAL PATHOLOGY STUDY: SIGNIFICANT CHANGE UP

## 2019-02-22 ENCOUNTER — RX RENEWAL (OUTPATIENT)
Age: 59
End: 2019-02-22

## 2019-02-22 RX ORDER — HYDROMORPHONE HYDROCHLORIDE 4 MG/1
4 TABLET ORAL
Qty: 120 | Refills: 0 | Status: ACTIVE | COMMUNITY
Start: 2019-02-22 | End: 1900-01-01

## 2019-02-25 ENCOUNTER — APPOINTMENT (OUTPATIENT)
Dept: ORTHOPEDIC SURGERY | Facility: CLINIC | Age: 59
End: 2019-02-25
Payer: OTHER MISCELLANEOUS

## 2019-02-25 VITALS — HEIGHT: 74 IN | WEIGHT: 249 LBS | BODY MASS INDEX: 31.95 KG/M2

## 2019-02-25 PROBLEM — I10 ESSENTIAL (PRIMARY) HYPERTENSION: Chronic | Status: ACTIVE | Noted: 2019-02-07

## 2019-02-25 PROCEDURE — 99024 POSTOP FOLLOW-UP VISIT: CPT

## 2019-02-25 RX ORDER — DIAZEPAM 5 MG/1
5 TABLET ORAL 3 TIMES DAILY
Qty: 90 | Refills: 0 | Status: ACTIVE | COMMUNITY
Start: 2019-02-25 | End: 1900-01-01

## 2019-02-25 RX ORDER — PREDNISONE 10 MG/1
10 TABLET ORAL
Qty: 30 | Refills: 1 | Status: ACTIVE | COMMUNITY
Start: 2019-02-25 | End: 1900-01-01

## 2019-02-25 NOTE — HISTORY OF PRESENT ILLNESS
[3] : the patient reports pain that is 3/10 in severity [Clean/Dry/Intact] : clean, dry and intact [Neuro Intact] : an unremarkable neurological exam [Slow Progress] : is progressing slowly [No Sign of Infection] : is showing no signs of infection [Adequate Pain Control] : has adequate pain control [No ADLs] : to avoid most activities of daily living [No Work] : not to work [No Housework] : not to do housework [de-identified] : s/p posterior L3-L5 lumbar laminectomy and discectomy on 02/13/19 [de-identified] : Preop symptoms improved.  Some recurrence of symptoms after 4 days.  [de-identified] : Improved neurologically [de-identified] : Prednisone Taper, renewal of valium.

## 2019-02-25 NOTE — HISTORY OF PRESENT ILLNESS
[3] : the patient reports pain that is 3/10 in severity [Clean/Dry/Intact] : clean, dry and intact [Neuro Intact] : an unremarkable neurological exam [Slow Progress] : is progressing slowly [No Sign of Infection] : is showing no signs of infection [Adequate Pain Control] : has adequate pain control [No ADLs] : to avoid most activities of daily living [No Work] : not to work [No Housework] : not to do housework [de-identified] : s/p posterior L3-L5 lumbar laminectomy and discectomy on 02/13/19 [de-identified] : Preop symptoms improved.  Some recurrence of symptoms after 4 days.  [de-identified] : Improved neurologically [de-identified] : Prednisone Taper, renewal of valium.

## 2019-03-04 ENCOUNTER — APPOINTMENT (OUTPATIENT)
Dept: ORTHOPEDIC SURGERY | Facility: CLINIC | Age: 59
End: 2019-03-04
Payer: OTHER MISCELLANEOUS

## 2019-03-04 PROCEDURE — 99024 POSTOP FOLLOW-UP VISIT: CPT

## 2019-03-04 NOTE — HISTORY OF PRESENT ILLNESS
[___ Months Post Op] : [unfilled] months post op [3] : the patient reports pain that is 3/10 in severity [Clean/Dry/Intact] : clean, dry and intact [Neuro Intact] : an unremarkable neurological exam [Slow Progress] : is progressing slowly [No Sign of Infection] : is showing no signs of infection [Adequate Pain Control] : has adequate pain control [No Work] : not to work [Limited ADLs] : to participate in activities of daily living with limitations [No Housework] : not to do housework [de-identified] : s/p posterior L3-L5 lumbar laminectomy and discectomy on 02/13/19 [de-identified] : Preop symptoms improved.   [de-identified] : Improved neurologically [de-identified] : PT, RTO 3 weeks.

## 2019-03-21 ENCOUNTER — NON-APPOINTMENT (OUTPATIENT)
Age: 59
End: 2019-03-21

## 2019-03-21 ENCOUNTER — APPOINTMENT (OUTPATIENT)
Dept: CARDIOLOGY | Facility: CLINIC | Age: 59
End: 2019-03-21
Payer: MEDICARE

## 2019-03-21 VITALS
OXYGEN SATURATION: 96 % | BODY MASS INDEX: 31.18 KG/M2 | DIASTOLIC BLOOD PRESSURE: 84 MMHG | SYSTOLIC BLOOD PRESSURE: 131 MMHG | WEIGHT: 243 LBS | HEIGHT: 74 IN | HEART RATE: 85 BPM

## 2019-03-21 VITALS — SYSTOLIC BLOOD PRESSURE: 114 MMHG | DIASTOLIC BLOOD PRESSURE: 70 MMHG

## 2019-03-21 PROCEDURE — 93000 ELECTROCARDIOGRAM COMPLETE: CPT

## 2019-03-21 PROCEDURE — 99214 OFFICE O/P EST MOD 30 MIN: CPT

## 2019-03-21 NOTE — PHYSICAL EXAM
[Well Groomed] : well groomed [General Appearance - In No Acute Distress] : no acute distress [Normal Conjunctiva] : the conjunctiva exhibited no abnormalities [Eyelids - No Xanthelasma] : the eyelids demonstrated no xanthelasmas [Normal Oral Mucosa] : normal oral mucosa [No Oral Pallor] : no oral pallor [No Oral Cyanosis] : no oral cyanosis [Normal Jugular Venous A Waves Present] : normal jugular venous A waves present [Normal Jugular Venous V Waves Present] : normal jugular venous V waves present [No Jugular Venous Anguiano A Waves] : no jugular venous anguiano A waves [Respiration, Rhythm And Depth] : normal respiratory rhythm and effort [Exaggerated Use Of Accessory Muscles For Inspiration] : no accessory muscle use [Auscultation Breath Sounds / Voice Sounds] : lungs were clear to auscultation bilaterally [Abdomen Soft] : soft [Abdomen Tenderness] : non-tender [Abdomen Mass (___ Cm)] : no abdominal mass palpated [Abnormal Walk] : normal gait [Nail Clubbing] : no clubbing of the fingernails [Cyanosis, Localized] : no localized cyanosis [Petechial Hemorrhages (___cm)] : no petechial hemorrhages [Skin Color & Pigmentation] : normal skin color and pigmentation [] : no rash [No Venous Stasis] : no venous stasis [Skin Lesions] : no skin lesions [No Skin Ulcers] : no skin ulcer [No Xanthoma] : no  xanthoma was observed [Oriented To Time, Place, And Person] : oriented to person, place, and time [Affect] : the affect was normal [Mood] : the mood was normal [No Anxiety] : not feeling anxious [Normal Rate] : normal [Rhythm Regular] : regular [Normal S1] : normal S1 [Normal S2] : normal S2 [No Gallop] : no gallop heard [No Murmur] : no murmurs heard [2+] : left 2+ [Right Carotid Bruit] : no bruit heard over the right carotid [Left Carotid Bruit] : no bruit heard over the left carotid [Bruit] : no bruit heard [No Pitting Edema] : no pitting edema present

## 2019-03-21 NOTE — HISTORY OF PRESENT ILLNESS
[FreeTextEntry1] : 58 year old man with a history of cauda equina syndrome, hyperlipidemia, early family history of CAD, coronary calcifications, smoker.\par \par He was sent to [place] for a cardiac CT by his primary cardiologist and was aborted secondary to calcium. Though he underwent a pharmacological nuclear stress test in our office on 1/25/19 that was not significant for ischemia. He was noted to have increased PVCs. \par \par He is now status post lumbar laminectomy. He is complaining of expected back pain and some numbness in his legs. \par  He   denies any chest pain, PND, orthopnea, lower extremity edema, near syncope, syncope, strokelike symptoms.  He is compliant with his medications. \par \par

## 2019-03-21 NOTE — DISCUSSION/SUMMARY
[FreeTextEntry1] : 58 year man with a history as listed presents for a followup cardiac evaluation. \par Mark doing well from a cardiac standpoint.  He had a recent coronary CT with significant calcification in all vessels. Though the pharmacological nuclear stress test on 1/25/19 was unrevealing for ischemia. He frequent PVCs noted on the stress that likely was reactive. His EKG today was normal. He had an echo in  that showed normal systolic LV function without any significant other findings, including no significant valvular disease. \par He currently he denies any anginal symptoms. Clinically he is euvolemic on exam. His EKG did not reveal any significant ischemic changes. \par His blood pressure is elevated but likely reactive to pain and stress He will continue  Toprol  100mg Qday for his BP and PVCs. \par He will continue with statin therapy. At your convenience, please fax me his latest lab results including lipid profile. \par Smoking cessation counseling was performed. \par He will followup with me in 3 months. \par

## 2019-03-25 ENCOUNTER — APPOINTMENT (OUTPATIENT)
Dept: ORTHOPEDIC SURGERY | Facility: CLINIC | Age: 59
End: 2019-03-25
Payer: OTHER MISCELLANEOUS

## 2019-03-25 PROCEDURE — 99024 POSTOP FOLLOW-UP VISIT: CPT

## 2019-03-25 RX ORDER — MELOXICAM 15 MG/1
15 TABLET ORAL
Qty: 30 | Refills: 1 | Status: ACTIVE | COMMUNITY
Start: 2019-03-25 | End: 1900-01-01

## 2019-03-25 RX ORDER — GABAPENTIN 100 MG/1
100 CAPSULE ORAL
Qty: 42 | Refills: 0 | Status: ACTIVE | COMMUNITY
Start: 2019-03-25 | End: 1900-01-01

## 2019-03-25 NOTE — HISTORY OF PRESENT ILLNESS
[Clean/Dry/Intact] : clean, dry and intact [Neuro Intact] : an unremarkable neurological exam [Slow Progress] : is progressing slowly [No Sign of Infection] : is showing no signs of infection [Adequate Pain Control] : has adequate pain control [Limited ADLs] : to participate in activities of daily living with limitations [No Work] : not to work [No Housework] : not to do housework [___ Weeks Post Op] : [unfilled] weeks post op [3] : the patient reports pain that is 3/10 in severity [Hardware in Good Position] : hardware in good position [de-identified] : s/p posterior L3-L5 lumbar laminectomy and discectomy on 02/13/19 [de-identified] : Pt c/o low back pain, radiates to B/L LE, associated with numbness, tingling to B/L LE. Pt is not taking meds for pain at this time, Pt participates with PT. Pt completed prednisone taper 1 month ago.\par  [de-identified] : Improved neurologically [de-identified] : 57 yo male with laminectomy with improved symptoms however he has intermittent radiculopathy from prolonged sitting that flares up.

## 2019-04-08 ENCOUNTER — APPOINTMENT (OUTPATIENT)
Dept: ORTHOPEDIC SURGERY | Facility: CLINIC | Age: 59
End: 2019-04-08
Payer: OTHER MISCELLANEOUS

## 2019-04-08 PROCEDURE — 72100 X-RAY EXAM L-S SPINE 2/3 VWS: CPT

## 2019-04-08 PROCEDURE — 99024 POSTOP FOLLOW-UP VISIT: CPT

## 2019-04-08 NOTE — HISTORY OF PRESENT ILLNESS
[___ Months Post Op] : [unfilled] months post op [3] : the patient reports pain that is 3/10 in severity [Clean/Dry/Intact] : clean, dry and intact [Neuro Intact] : an unremarkable neurological exam [Slow Progress] : is progressing slowly [No Sign of Infection] : is showing no signs of infection [Adequate Pain Control] : has adequate pain control [Limited ADLs] : to participate in activities of daily living with limitations [No Work] : not to work [No Housework] : not to do housework [Xray (Date:___)] : [unfilled] Xray -  [de-identified] : s/p posterior L3-L5 lumbar laminectomy and discectomy on 02/13/19 [de-identified] : Pt c/o low back pain, radiates to B/L LE, associated with numbness, tingling to B/L LE. Pt is not taking meds for pain at this time,  prescribed last week. Alternatively took left over opiods.  Symptoms returned and are constant.  [de-identified] : Improved neurologically(from prior to surgery) [de-identified] : 59 yo male with laminectomy with improved symptoms last 2 weeks symptoms have returned and are constant. Overall neurologically improved, however still pain symptoms, recommend and counseled patient to try MOBIC and Neurontin. We will obtain Lumbar MRI with/without contrast.

## 2019-04-12 ENCOUNTER — RX RENEWAL (OUTPATIENT)
Age: 59
End: 2019-04-12

## 2019-04-12 RX ORDER — CYCLOBENZAPRINE HYDROCHLORIDE 10 MG/1
10 TABLET, FILM COATED ORAL 3 TIMES DAILY
Qty: 30 | Refills: 0 | Status: ACTIVE | COMMUNITY
Start: 2019-04-12 | End: 1900-01-01

## 2019-04-13 ENCOUNTER — INPATIENT (INPATIENT)
Facility: HOSPITAL | Age: 59
LOS: 2 days | Discharge: HOME | DRG: 637 | End: 2019-04-16
Attending: HOSPITALIST | Admitting: HOSPITALIST
Payer: MEDICARE

## 2019-04-13 VITALS
HEIGHT: 74 IN | OXYGEN SATURATION: 97 % | SYSTOLIC BLOOD PRESSURE: 129 MMHG | RESPIRATION RATE: 20 BRPM | DIASTOLIC BLOOD PRESSURE: 85 MMHG | TEMPERATURE: 98 F | WEIGHT: 220.02 LBS | HEART RATE: 88 BPM

## 2019-04-13 DIAGNOSIS — M54.9 DORSALGIA, UNSPECIFIED: ICD-10-CM

## 2019-04-13 DIAGNOSIS — E78.5 HYPERLIPIDEMIA, UNSPECIFIED: ICD-10-CM

## 2019-04-13 DIAGNOSIS — I10 ESSENTIAL (PRIMARY) HYPERTENSION: ICD-10-CM

## 2019-04-13 DIAGNOSIS — Z98.890 OTHER SPECIFIED POSTPROCEDURAL STATES: Chronic | ICD-10-CM

## 2019-04-13 DIAGNOSIS — R63.4 ABNORMAL WEIGHT LOSS: ICD-10-CM

## 2019-04-13 DIAGNOSIS — Z29.9 ENCOUNTER FOR PROPHYLACTIC MEASURES, UNSPECIFIED: ICD-10-CM

## 2019-04-13 DIAGNOSIS — R73.9 HYPERGLYCEMIA, UNSPECIFIED: ICD-10-CM

## 2019-04-13 DIAGNOSIS — M43.22 FUSION OF SPINE, CERVICAL REGION: Chronic | ICD-10-CM

## 2019-04-13 LAB
ALBUMIN SERPL ELPH-MCNC: 4.4 G/DL — SIGNIFICANT CHANGE UP (ref 3.3–5)
ALP SERPL-CCNC: 143 U/L — HIGH (ref 40–120)
ALT FLD-CCNC: 25 U/L — SIGNIFICANT CHANGE UP (ref 10–45)
ANION GAP SERPL CALC-SCNC: 19 MMOL/L — HIGH (ref 5–17)
ANION GAP SERPL CALC-SCNC: 20 MMOL/L — HIGH (ref 5–17)
APPEARANCE UR: CLEAR — SIGNIFICANT CHANGE UP
AST SERPL-CCNC: 17 U/L — SIGNIFICANT CHANGE UP (ref 10–40)
BACTERIA # UR AUTO: NEGATIVE — SIGNIFICANT CHANGE UP
BASOPHILS # BLD AUTO: 0.1 K/UL — SIGNIFICANT CHANGE UP (ref 0–0.2)
BASOPHILS NFR BLD AUTO: 1 % — SIGNIFICANT CHANGE UP (ref 0–2)
BILIRUB SERPL-MCNC: 0.4 MG/DL — SIGNIFICANT CHANGE UP (ref 0.2–1.2)
BILIRUB UR-MCNC: NEGATIVE — SIGNIFICANT CHANGE UP
BUN SERPL-MCNC: 12 MG/DL — SIGNIFICANT CHANGE UP (ref 7–23)
BUN SERPL-MCNC: 12 MG/DL — SIGNIFICANT CHANGE UP (ref 7–23)
CALCIUM SERPL-MCNC: 10.4 MG/DL — SIGNIFICANT CHANGE UP (ref 8.4–10.5)
CALCIUM SERPL-MCNC: 9.8 MG/DL — SIGNIFICANT CHANGE UP (ref 8.4–10.5)
CHLORIDE SERPL-SCNC: 91 MMOL/L — LOW (ref 96–108)
CHLORIDE SERPL-SCNC: 97 MMOL/L — SIGNIFICANT CHANGE UP (ref 96–108)
CK SERPL-CCNC: 39 U/L — SIGNIFICANT CHANGE UP (ref 30–200)
CO2 SERPL-SCNC: 20 MMOL/L — LOW (ref 22–31)
CO2 SERPL-SCNC: 20 MMOL/L — LOW (ref 22–31)
COLOR SPEC: YELLOW — SIGNIFICANT CHANGE UP
CREAT SERPL-MCNC: 0.64 MG/DL — SIGNIFICANT CHANGE UP (ref 0.5–1.3)
CREAT SERPL-MCNC: 0.73 MG/DL — SIGNIFICANT CHANGE UP (ref 0.5–1.3)
DIFF PNL FLD: NEGATIVE — SIGNIFICANT CHANGE UP
EOSINOPHIL # BLD AUTO: 0.3 K/UL — SIGNIFICANT CHANGE UP (ref 0–0.5)
EOSINOPHIL NFR BLD AUTO: 2.6 % — SIGNIFICANT CHANGE UP (ref 0–6)
EPI CELLS # UR: 0 /HPF — SIGNIFICANT CHANGE UP
GAS PNL BLDV: SIGNIFICANT CHANGE UP
GAS PNL BLDV: SIGNIFICANT CHANGE UP
GLUCOSE BLDC GLUCOMTR-MCNC: 210 MG/DL — HIGH (ref 70–99)
GLUCOSE SERPL-MCNC: 369 MG/DL — HIGH (ref 70–99)
GLUCOSE SERPL-MCNC: 558 MG/DL — CRITICAL HIGH (ref 70–99)
GLUCOSE UR QL: ABNORMAL
HCT VFR BLD CALC: 44.9 % — SIGNIFICANT CHANGE UP (ref 39–50)
HGB BLD-MCNC: 16.4 G/DL — SIGNIFICANT CHANGE UP (ref 13–17)
HYALINE CASTS # UR AUTO: 0 /LPF — SIGNIFICANT CHANGE UP (ref 0–2)
KETONES UR-MCNC: ABNORMAL
LEUKOCYTE ESTERASE UR-ACNC: NEGATIVE — SIGNIFICANT CHANGE UP
LYMPHOCYTES # BLD AUTO: 2.8 K/UL — SIGNIFICANT CHANGE UP (ref 1–3.3)
LYMPHOCYTES # BLD AUTO: 28.2 % — SIGNIFICANT CHANGE UP (ref 13–44)
MCHC RBC-ENTMCNC: 31.3 PG — SIGNIFICANT CHANGE UP (ref 27–34)
MCHC RBC-ENTMCNC: 36.4 GM/DL — HIGH (ref 32–36)
MCV RBC AUTO: 85.8 FL — SIGNIFICANT CHANGE UP (ref 80–100)
MONOCYTES # BLD AUTO: 0.8 K/UL — SIGNIFICANT CHANGE UP (ref 0–0.9)
MONOCYTES NFR BLD AUTO: 7.6 % — SIGNIFICANT CHANGE UP (ref 2–14)
NEUTROPHILS # BLD AUTO: 6 K/UL — SIGNIFICANT CHANGE UP (ref 1.8–7.4)
NEUTROPHILS NFR BLD AUTO: 60.6 % — SIGNIFICANT CHANGE UP (ref 43–77)
NITRITE UR-MCNC: NEGATIVE — SIGNIFICANT CHANGE UP
PH UR: 6 — SIGNIFICANT CHANGE UP (ref 5–8)
PLATELET # BLD AUTO: 233 K/UL — SIGNIFICANT CHANGE UP (ref 150–400)
POTASSIUM SERPL-MCNC: 3.9 MMOL/L — SIGNIFICANT CHANGE UP (ref 3.5–5.3)
POTASSIUM SERPL-MCNC: 4.6 MMOL/L — SIGNIFICANT CHANGE UP (ref 3.5–5.3)
POTASSIUM SERPL-SCNC: 3.9 MMOL/L — SIGNIFICANT CHANGE UP (ref 3.5–5.3)
POTASSIUM SERPL-SCNC: 4.6 MMOL/L — SIGNIFICANT CHANGE UP (ref 3.5–5.3)
PROT SERPL-MCNC: 7.9 G/DL — SIGNIFICANT CHANGE UP (ref 6–8.3)
PROT UR-MCNC: NEGATIVE — SIGNIFICANT CHANGE UP
RBC # BLD: 5.23 M/UL — SIGNIFICANT CHANGE UP (ref 4.2–5.8)
RBC # FLD: 12.3 % — SIGNIFICANT CHANGE UP (ref 10.3–14.5)
RBC CASTS # UR COMP ASSIST: 1 /HPF — SIGNIFICANT CHANGE UP (ref 0–4)
SODIUM SERPL-SCNC: 131 MMOL/L — LOW (ref 135–145)
SODIUM SERPL-SCNC: 136 MMOL/L — SIGNIFICANT CHANGE UP (ref 135–145)
SP GR SPEC: 1.04 — HIGH (ref 1.01–1.02)
TSH SERPL-MCNC: 2.5 UIU/ML — SIGNIFICANT CHANGE UP (ref 0.27–4.2)
UROBILINOGEN FLD QL: NEGATIVE — SIGNIFICANT CHANGE UP
WBC # BLD: 9.9 K/UL — SIGNIFICANT CHANGE UP (ref 3.8–10.5)
WBC # FLD AUTO: 9.9 K/UL — SIGNIFICANT CHANGE UP (ref 3.8–10.5)
WBC UR QL: 1 /HPF — SIGNIFICANT CHANGE UP (ref 0–5)

## 2019-04-13 PROCEDURE — 99285 EMERGENCY DEPT VISIT HI MDM: CPT

## 2019-04-13 PROCEDURE — 99223 1ST HOSP IP/OBS HIGH 75: CPT | Mod: GC

## 2019-04-13 PROCEDURE — 71046 X-RAY EXAM CHEST 2 VIEWS: CPT | Mod: 26

## 2019-04-13 RX ORDER — DEXTROSE 50 % IN WATER 50 %
15 SYRINGE (ML) INTRAVENOUS ONCE
Qty: 0 | Refills: 0 | Status: DISCONTINUED | OUTPATIENT
Start: 2019-04-13 | End: 2019-04-13

## 2019-04-13 RX ORDER — METOPROLOL TARTRATE 50 MG
1 TABLET ORAL
Qty: 0 | Refills: 0 | COMMUNITY

## 2019-04-13 RX ORDER — DEXTROSE 50 % IN WATER 50 %
12.5 SYRINGE (ML) INTRAVENOUS ONCE
Qty: 0 | Refills: 0 | Status: DISCONTINUED | OUTPATIENT
Start: 2019-04-13 | End: 2019-04-13

## 2019-04-13 RX ORDER — GLUCAGON INJECTION, SOLUTION 0.5 MG/.1ML
1 INJECTION, SOLUTION SUBCUTANEOUS ONCE
Qty: 0 | Refills: 0 | Status: DISCONTINUED | OUTPATIENT
Start: 2019-04-13 | End: 2019-04-13

## 2019-04-13 RX ORDER — INSULIN GLARGINE 100 [IU]/ML
25 INJECTION, SOLUTION SUBCUTANEOUS ONCE
Qty: 0 | Refills: 0 | Status: COMPLETED | OUTPATIENT
Start: 2019-04-13 | End: 2019-04-13

## 2019-04-13 RX ORDER — POTASSIUM GLUCONATE 2.5 MEQ
1 TABLET ORAL
Qty: 0 | Refills: 0 | COMMUNITY

## 2019-04-13 RX ORDER — DEXTROSE 50 % IN WATER 50 %
25 SYRINGE (ML) INTRAVENOUS ONCE
Qty: 0 | Refills: 0 | Status: DISCONTINUED | OUTPATIENT
Start: 2019-04-13 | End: 2019-04-13

## 2019-04-13 RX ORDER — SODIUM CHLORIDE 9 MG/ML
1000 INJECTION, SOLUTION INTRAVENOUS
Qty: 0 | Refills: 0 | Status: DISCONTINUED | OUTPATIENT
Start: 2019-04-13 | End: 2019-04-13

## 2019-04-13 RX ORDER — SODIUM CHLORIDE 9 MG/ML
1000 INJECTION INTRAMUSCULAR; INTRAVENOUS; SUBCUTANEOUS
Qty: 0 | Refills: 0 | Status: DISCONTINUED | OUTPATIENT
Start: 2019-04-13 | End: 2019-04-14

## 2019-04-13 RX ORDER — INSULIN LISPRO 100/ML
VIAL (ML) SUBCUTANEOUS EVERY 6 HOURS
Qty: 0 | Refills: 0 | Status: DISCONTINUED | OUTPATIENT
Start: 2019-04-13 | End: 2019-04-14

## 2019-04-13 RX ORDER — INSULIN LISPRO 100/ML
10 VIAL (ML) SUBCUTANEOUS ONCE
Qty: 0 | Refills: 0 | Status: COMPLETED | OUTPATIENT
Start: 2019-04-13 | End: 2019-04-13

## 2019-04-13 RX ORDER — SODIUM CHLORIDE 9 MG/ML
2000 INJECTION INTRAMUSCULAR; INTRAVENOUS; SUBCUTANEOUS ONCE
Qty: 0 | Refills: 0 | Status: COMPLETED | OUTPATIENT
Start: 2019-04-13 | End: 2019-04-13

## 2019-04-13 RX ADMIN — Medication 10 UNIT(S): at 17:37

## 2019-04-13 RX ADMIN — SODIUM CHLORIDE 175 MILLILITER(S): 9 INJECTION INTRAMUSCULAR; INTRAVENOUS; SUBCUTANEOUS at 20:13

## 2019-04-13 RX ADMIN — INSULIN GLARGINE 25 UNIT(S): 100 INJECTION, SOLUTION SUBCUTANEOUS at 17:36

## 2019-04-13 RX ADMIN — SODIUM CHLORIDE 2000 MILLILITER(S): 9 INJECTION INTRAMUSCULAR; INTRAVENOUS; SUBCUTANEOUS at 16:24

## 2019-04-13 NOTE — H&P ADULT - NSHPLABSRESULTS_GEN_ALL_CORE
16.4   9.9   )-----------( 233      ( 2019 15:43 )             44.9     Hgb Trend: 16.4<--      136  |  97  |  12  ----------------------------<  369<H>  3.9   |  20<L>  |  0.64    Ca    9.8      2019 19:00    TPro  7.9  /  Alb  4.4  /  TBili  0.4  /  DBili  x   /  AST  17  /  ALT  25  /  AlkPhos  143<H>      Creatinine Trend: 0.64<--, 0.73<--    CARDIAC MARKERS ( 2019 15:43 )  x     / x     / 39 U/L / x     / x          Urinalysis Basic - ( 2019 16:20 )    Color: Yellow / Appearance: Clear / S.039 / pH: x  Gluc: x / Ketone: Moderate  / Bili: Negative / Urobili: Negative   Blood: x / Protein: Negative / Nitrite: Negative   Leuk Esterase: Negative / RBC: 1 /hpf / WBC 1 /HPF   Sq Epi: x / Non Sq Epi: 0 /hpf / Bacteria: Negative    < from: Xray Chest 2 Views PA/Lat (19 @ 16:50) >    INTERPRETATION:  no emergent finding  follow up official report Labs, imaging and EKG personally reviewed and interpreted by me - normal WBC, Hb, Cr 0.64, glucose 558 on admission, with AG 20 and bicarb 20, beta hydroxy 2.8. CXR with no focal consolidations. EKG NSR 70s, no ischemic changes.               16.4   9.9   )-----------( 233      ( 2019 15:43 )             44.9     Hgb Trend: 16.4<--      136  |  97  |  12  ----------------------------<  369<H>  3.9   |  20<L>  |  0.64    Ca    9.8      2019 19:00    TPro  7.9  /  Alb  4.4  /  TBili  0.4  /  DBili  x   /  AST  17  /  ALT  25  /  AlkPhos  143<H>      Creatinine Trend: 0.64<--, 0.73<--    CARDIAC MARKERS ( 2019 15:43 )  x     / x     / 39 U/L / x     / x          Urinalysis Basic - ( 2019 16:20 )  Color: Yellow / Appearance: Clear / S.039 / pH: x  Gluc: x / Ketone: Moderate  / Bili: Negative / Urobili: Negative   Blood: x / Protein: Negative / Nitrite: Negative   Leuk Esterase: Negative / RBC: 1 /hpf / WBC 1 /HPF   Sq Epi: x / Non Sq Epi: 0 /hpf / Bacteria: Negative    < from: Xray Chest 2 Views PA/Lat (19 @ 16:50) >  INTERPRETATION:  no emergent finding  follow up official report
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
## 2019-04-13 NOTE — H&P ADULT - NSICDXPASTSURGICALHX_GEN_ALL_CORE_FT
PAST SURGICAL HISTORY:  Cervical vertebral fusion 2015, 1/2018    H/O shoulder surgery right 2014 left 2004    S/p bilateral carpal tunnel release 2011 2012

## 2019-04-13 NOTE — H&P ADULT - PROBLEM SELECTOR PLAN 6
- DVT prophylaxis -   - Diet- - Will get GGT and vitamin d levels. -Likely from uncontrolled diabetes  -Patient had colonoscopy in 2018, which showed 3 small non-cancerious polyps.   -Constipation likely from pain meds and poor appetite.

## 2019-04-13 NOTE — ED PROVIDER NOTE - CLINICAL SUMMARY MEDICAL DECISION MAKING FREE TEXT BOX
59yo male with fatigue, weight loss, concerning for DM vs metabolic derangement, will check labs, ekg, cxr, tsh, ua, reassess. Lawanda Bennett DO 57yo male with fatigue, weight loss, concerning for DM vs metabolic derangement, will check labs, ekg, cxr, tsh, ua, reassess. Lawanda Gillis pt with general weakness no focality wt loss 30lbs in 2 months - dec appetite -- will ch cpk for muscle breakdown, lytes and tsh and reeval pt sp spinal surg no bowel or bladder changes pt stregth le intact - on chronic pain meds   will check finger stick as pt endorses freq urination concern for new onset diabetes

## 2019-04-13 NOTE — H&P ADULT - ATTENDING COMMENTS
Patient assigned to me by night hospitalist in charge for management and care for patient for this evening only. Care to be resumed by day hospitalist in the morning and thereafter.     Pt seen and examined. Case d/w house staff Dr. Harris. Agree with assessment and plan, with changes made where appropriate.

## 2019-04-13 NOTE — H&P ADULT - PROBLEM SELECTOR PLAN 4
- On rosuvastatin 5mg at home, therapeutic interchange to atorvastatin.   - Lipid panel in the am, calculate ASCVD score and if diabetic consider moderate vs high intensity statin. - On rosuvastatin 5mg at home, therapeutic interchange to atorvastatin.   - Started on high intensity statin given significant family history.   - Lipid panel in the am. Will need increased dose of rosuvastatin upon discharge. - Patient on metoprolol suc 100mg daily, will do metoprolol 25mg BID for now given soft BP  - monitor BP routinely

## 2019-04-13 NOTE — CHART NOTE - NSCHARTNOTEFT_GEN_A_CORE
Patient presenting with new DM, labs consistent with DKA (AG is 20, BG almost 600, BHB 2.8 with moderate ketones in urine). Ordered for Humalog 10, recommend administer Lantus 25 units now. Patient should remain NPO, start IVF, administer low correction Humalog q6 hrs. Recommend recheck labs for DKA several hours after Lantus has been given.    Full consult to follow in AM.    Catalina Peraza MD  Endocrine Fellow Patient presenting with new DM, labs consistent with DKA (AG is 20, BG in 500's, BHB 2.8 with moderate ketones in urine). Ordered for Humalog 10, recommend administer Lantus 25 units now. Patient should remain NPO, start IVF, administer low correction Humalog q6 hrs. Recommend recheck labs for DKA several hours after Lantus has been given.    Full consult to follow in AM.    Catalina Peraza MD  Endocrine Fellow

## 2019-04-13 NOTE — ED PROVIDER NOTE - PROGRESS NOTE DETAILS
pt fs in 400's ryley new onset diabetes will give iv fluids , ck abd for ph and reeval ph7.35 there is a gap of 20 and pos ketones will give sq insulin and lantus and repeat gas and bmp as long as ph and gap are improving admit tomed if worseing consider insulin drip and icu for mild dka Spoke with endocrine, will see patient in AM. Mild DKA, recommend closing gap in ED. Will give humalog 10U and 25U lantus, rpt labs. if improvement in anion gap will admit to medicine. If worsening MICU consult and insulin gtt. Lawanda Bennett DO repeat gas ph improving, gap closing - glucose 300 will admit to med Resident: spoke to Dr. Calero (PMD), will admit to hospitalist. Resident: Spoke to Dr. Peraza (endo) who clears patient for admission to floor with 4 hour corrective dose, which she will order for patient now.

## 2019-04-13 NOTE — H&P ADULT - PROBLEM SELECTOR PLAN 9
Patient also endorses taking a lot of supplements, such as glucosamine, coffee bean extract, testosterone pills, multivitamin, niacin, vitamin c, flax seed oil and "two plus carbs".   - Will need full med rec to assess for any predisposing factors. - Lovenox for DVT ppx   - Diet- restart DASH/ consistent carb diet once anion gap closes.

## 2019-04-13 NOTE — ED ADULT NURSE NOTE - OBJECTIVE STATEMENT
59 y/o male with history of  back pain s/p spinal surgery 2 months ago, pt c/o weakness, lethargy, trouble sleeping, weight loss of 30lb over last 2 months, and increased urination.   Pt came to the ER because family member noticed weight loss. Patient states that prior to back surgery he was at the gym working out frequently but has not worked out since.  No fever, no chills, no chest pain.  Respiration easy and non labored.  Extremities mobile.

## 2019-04-13 NOTE — ED PROVIDER NOTE - CARE PLAN
Principal Discharge DX:	Weight loss  Secondary Diagnosis:	Frequent urination Principal Discharge DX:	Weight loss  Secondary Diagnosis:	Frequent urination  Secondary Diagnosis:	Hyperglycemia
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
## 2019-04-13 NOTE — ED PROVIDER NOTE - OBJECTIVE STATEMENT
59yo male PMH back pain s/p spinal surgery 2 months ago, HTN, hyperlipidemia, presenting with lethargy, trouble sleeping, and 30lb weight loss over last 2 months, a/w increased urination. No fevers/chills. patient coming today because family member noticed weight loss. Patient states that prior to back surgery he was in gym working out frequently but has not worked out since.

## 2019-04-13 NOTE — H&P ADULT - NSHPOUTPATIENTPROVIDERS_GEN_ALL_CORE
Ortho Dr. Momo Contreras  Cards Dr. Balbina Clement PCP Dr. Eric Contreras  Cards Dr. Balbina Clement

## 2019-04-13 NOTE — H&P ADULT - NSHPPHYSICALEXAM_GEN_ALL_CORE
VITAL SIGNS (Last 24 hrs):  T(C): 36.8 (04-13-19 @ 19:10), Max: 36.8 (04-13-19 @ 19:10)  HR: 82 (04-13-19 @ 19:10) (82 - 88)  BP: 127/80 (04-13-19 @ 19:10) (127/80 - 130/81)  RR: 16 (04-13-19 @ 19:10) (16 - 20)  SpO2: 97% (04-13-19 @ 19:10) (97% - 98%)  Daily Height in cm: 187.96 (13 Apr 2019 13:46)    Daily     I&O's Summary VITAL SIGNS (Last 24 hrs):  T(C): 36.8 (04-13-19 @ 19:10), Max: 36.8 (04-13-19 @ 19:10)  HR: 82 (04-13-19 @ 19:10) (82 - 88)  BP: 127/80 (04-13-19 @ 19:10) (127/80 - 130/81)  RR: 16 (04-13-19 @ 19:10) (16 - 20)  SpO2: 97% (04-13-19 @ 19:10) (97% - 98%)  Daily Height in cm: 187.96 (13 Apr 2019 13:46)    Daily     I&O's Summary    GENERAL: NAD, well-developed  HEAD:  Atraumatic, Normocephalic  EYES: EOMI, PERRLA, conjunctiva and sclera clear  NECK: Supple, No JVD  CHEST/LUNG: Clear to auscultation bilaterally; No wheeze/rhonchi/rales   HEART: Regular rate and rhythm; normal S1 S2,  No murmurs, rubs, or gallops  ABDOMEN: Soft, Nontender, Nondistended; Bowel sounds normal  EXTREMITIES: No edema bilaterally.   BACK: Lumbar paraspinal tenderness   PSYCH: AAOx3, No acute distress   NEUROLOGY: non-focal  SKIN: Multi tattos. VITAL SIGNS (Last 24 hrs):  T(C): 36.8 (04-13-19 @ 19:10), Max: 36.8 (04-13-19 @ 19:10)  HR: 82 (04-13-19 @ 19:10) (82 - 88)  BP: 127/80 (04-13-19 @ 19:10) (127/80 - 130/81)  RR: 16 (04-13-19 @ 19:10) (16 - 20)  SpO2: 97% (04-13-19 @ 19:10) (97% - 98%)  Daily Height in cm: 187.96 (13 Apr 2019 13:46)    Daily     I&O's Summary    GENERAL: NAD, well-developed  HEAD:  Atraumatic, Normocephalic  EYES: EOMI, PERRLA, conjunctiva and sclera clear  NECK: Supple, No JVD  CHEST/LUNG: Clear to auscultation bilaterally; No wheeze/rhonchi/rales   HEART: Regular rate and rhythm; normal S1 S2,  No murmurs, rubs, or gallops  ABDOMEN: Soft, Nontender, Nondistended; Bowel sounds normal  EXTREMITIES: No edema bilaterally.   BACK: Lumbar paraspinal tenderness below surgical scar.   PSYCH: AAOx3, No acute distress   NEUROLOGY: non-focal  SKIN: Multi tattoos. VITAL SIGNS (Last 24 hrs):  T(C): 36.8 (04-13-19 @ 19:10), Max: 36.8 (04-13-19 @ 19:10)  HR: 82 (04-13-19 @ 19:10) (82 - 88)  BP: 127/80 (04-13-19 @ 19:10) (127/80 - 130/81)  RR: 16 (04-13-19 @ 19:10) (16 - 20)  SpO2: 97% (04-13-19 @ 19:10) (97% - 98%)  Daily Height in cm: 187.96 (13 Apr 2019 13:46)      GENERAL: NAD, well-developed  HEAD:  Atraumatic, Normocephalic  EYES: EOMI, PERRLA, conjunctiva and sclera clear  NECK: Supple, No JVD  CHEST/LUNG: Clear to auscultation bilaterally; No wheeze/rhonchi/rales   HEART: Regular rate and rhythm; normal S1 S2,  No murmurs, rubs, or gallops  ABDOMEN: Soft, Nontender, Nondistended; Bowel sounds normal  EXTREMITIES: No edema bilaterally, no cyanosis   BACK: Lumbar paraspinal tenderness below surgical scar.   PSYCH: calm affect, not agitated   NEUROLOGY: non-focal, AAOx 3  SKIN: Multi tattoos, no rashes noted
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
## 2019-04-13 NOTE — ED ADULT NURSE REASSESSMENT NOTE - NS ED NURSE REASSESS COMMENT FT1
19:10. Report received from Rozina, RN. Pt AAOx4, NAD, resp nonlabored, skin warm/dry, resting comfortably in bed. Pt denies headache, dizziness, chest pain, palpitations, SOB, abd pain, n/v/d, urinary symptoms, fevers, chills, weakness at this time. Pt awaiting blood work results. Safety maintained.

## 2019-04-13 NOTE — H&P ADULT - PROBLEM SELECTOR PLAN 3
- - Patient on metoprolol suc 100mg daily, will do metoprolol 25mg BID given significant weight loss. - Patient on metoprolol suc 100mg daily, will do metoprolol 25mg BID given significant weight loss.  - Possible need acei/arb if diabetic. - Chronic back pain below surgical site in the lumbar area.   - CK wnl. TSH wnl. Will give acetaminophen IV x 1.   - will titrate gabapentin to 300mg BID as current dose has not improved pts pain  - Ibuprofen 400mg q6h prn   - Potentially restart cyclobenzaprine if not improving. Patient is not responsive to opiods, will defer for now   - PT eval   - would benefit from pain management f/u on discharge    - Lidocaine patch  - PT ordered.

## 2019-04-13 NOTE — CHART NOTE - NSCHARTNOTEFT_GEN_A_CORE
Reference #: [id number]     Others' Prescriptions    Patient Name: Mark Rhodes YOB: 1960   Address: [address] [place] [address] Sex: Male    02/25/2019 02/27/2019 diazepam 5 mg tablet  90 30 Momo Contreras MD     02/22/2019 02/23/2019 hydromorphone 4 mg tablet  120 30 Brandy Klein     02/15/2019 02/15/2019 diazepam 5 mg tablet  10 3 ValloneSissy     02/15/2019 02/15/2019 hydromorphone 4 mg tablet  42 7 Vallone,

## 2019-04-13 NOTE — H&P ADULT - PROBLEM SELECTOR PLAN 5
- Likely from uncontrolled diabetes - Likely from uncontrolled diabetes  - Will need outpatient colonoscopy. - Likely from uncontrolled diabetes  -Patient had colonoscopy in 2018, which showed 3 small non-cancerious polyps.   - Constipation likely from pain meds and poor appetite. - On rosuvastatin 5mg at home, therapeutic interchange to atorvastatin.   - Started on moderate intensity statin given significant family history and dx of DM  - Lipid panel in the am. Consider increased dose of rosuvastatin upon discharge.

## 2019-04-13 NOTE — H&P ADULT - PROBLEM SELECTOR PLAN 7
- Will place on nicotine patch while in the hospital.  - Consider smoking cessation medication upon discharge. - Will get GGT and vitamin d levels.

## 2019-04-13 NOTE — ED PROVIDER NOTE - PHYSICAL EXAMINATION
Gen: NAD  Head: NCAT  Lung: CTAB, no respiratory distress, no wheezing, rales, rhonchi  CV: normal s1/s2, rrr, no murmurs, Normal perfusion  Abd: soft, NTND  MSK: No edema, no visible deformities, full range of motion in all 4 extremities  Neuro:  No focal neurologic deficits  Skin: No rash   Psych: normal affect

## 2019-04-13 NOTE — H&P ADULT - PROBLEM SELECTOR PLAN 2
- CK wnl. TSH wnl.   - C/w cyclobenzaprine - Chronic back pain below surgical site in the lumbar area.   - CK wnl. TSH wnl. Will give acetaminophen IV x 1.   - Gabapentin 300mg BID  - Ibuprofen 400mg q6h prn   - Potentially restart cyclobenzaprine if not improving. Patient is not responsive to opiods.   - Lidocaine patch  - PT ordered. A1c 12.4 - this would be new diagnosis of DM for pt; likely type 2 vs latent diabetes in adult; was never taking steroids after surgery despite prescriptions, unlikely steroid induced    f/u endocrine recs regarding whether antibody testing required   insulin and FS monitoring as above for now    will need to be discharged on insulin, will require diabetic and insulin teaching, will need f/u as oupt with PMD vs endocrine  no protein in urine, no need to start ACEi/ARB for now

## 2019-04-13 NOTE — H&P ADULT - PROBLEM SELECTOR PLAN 1
- On admission, DKA (AG is 20, BG in 500's, BHB 2.8 with moderate ketones in urine). Downtrending FS and decreased AG.   - Endocrine recs appreciated.  - Keep NPO.   - C/w IVF   - FS q6, Humalog 6u q6h - On admission, DKA (AG is 20, BG in 500's, BHB 2.8 with moderate ketones in urine).   - s/p 10u humalog and lantus 25u - now with downtrending FS and decreasing AG.  - will keep NPO for now until gap closed   - c/w aggressive IVF hydration  - monitor FS q6h for now, place on sliding scale insulin    - Endocrine recs appreciated, full note to follow in AM

## 2019-04-13 NOTE — H&P ADULT - PROBLEM SELECTOR PLAN 8
- DVT prophylaxis - does not need pharm dvt prophylaxis   - Diet- restart DASH/ consistent carb diet once anion gap closes. - Will place on nicotine patch while in the hospital.  - Consider smoking cessation medication upon discharge.

## 2019-04-13 NOTE — H&P ADULT - PROBLEM SELECTOR PLAN 10
Patient also endorses taking a lot of supplements, such as glucosamine, coffee bean extract, testosterone pills, multivitamin, niacin, vitamin c, flax seed oil and "two plus carbs".   - Will need full med rec to assess for any predisposing factors.

## 2019-04-13 NOTE — H&P ADULT - NSHPREVIEWOFSYSTEMS_GEN_ALL_CORE
Constitutional: Weight loss, lethargy   Eyes: No recent vision problems or eye pain.  ENT: No congestion, ear pain, or sore throat.  Endocrine: No excess sweating, temperature intolerance  Cardiovascular: No chest pain, palpitations, shortness of breath, pre-syncope, syncope  Respiratory: No cough, congestion, or wheezing.  Gastrointestinal: No abdominal pain, nausea, vomiting, or diarrhea.  Genitourinary: Polyuria. No dysuria, hematuria  Musculoskeletal: Back pain and knee pain.   Neurologic: Lightheadedness. No headache, dizziness, focal weakness  Skin: No rashes, hematoma, prupura Constitutional: Weight loss, fatigue  Eyes: No recent vision problems or eye pain.  ENT: No congestion, ear pain, or sore throat.  Endocrine: No excess sweating, temperature intolerance  Cardiovascular: No chest pain, palpitations, shortness of breath, pre-syncope, syncope  Respiratory: No cough, congestion, or wheezing.  Gastrointestinal: No abdominal pain, nausea, vomiting, or diarrhea.  Genitourinary: Polyuria. No dysuria, hematuria  Musculoskeletal: +Back pain and knee pain.   Neurologic: Lightheadedness. No headache, dizziness, focal weakness  Skin: No rashes, hematoma, prupura  Psych: no anxiety, depression

## 2019-04-14 DIAGNOSIS — E11.65 TYPE 2 DIABETES MELLITUS WITH HYPERGLYCEMIA: ICD-10-CM

## 2019-04-14 DIAGNOSIS — E13.10 OTHER SPECIFIED DIABETES MELLITUS WITH KETOACIDOSIS WITHOUT COMA: ICD-10-CM

## 2019-04-14 DIAGNOSIS — E78.5 HYPERLIPIDEMIA, UNSPECIFIED: ICD-10-CM

## 2019-04-14 DIAGNOSIS — I10 ESSENTIAL (PRIMARY) HYPERTENSION: ICD-10-CM

## 2019-04-14 DIAGNOSIS — Z79.899 OTHER LONG TERM (CURRENT) DRUG THERAPY: ICD-10-CM

## 2019-04-14 DIAGNOSIS — F17.200 NICOTINE DEPENDENCE, UNSPECIFIED, UNCOMPLICATED: ICD-10-CM

## 2019-04-14 DIAGNOSIS — E11.9 TYPE 2 DIABETES MELLITUS WITHOUT COMPLICATIONS: ICD-10-CM

## 2019-04-14 DIAGNOSIS — R74.8 ABNORMAL LEVELS OF OTHER SERUM ENZYMES: ICD-10-CM

## 2019-04-14 DIAGNOSIS — E11.10 TYPE 2 DIABETES MELLITUS WITH KETOACIDOSIS WITHOUT COMA: ICD-10-CM

## 2019-04-14 LAB
24R-OH-CALCIDIOL SERPL-MCNC: 29.7 NG/ML — LOW (ref 30–80)
ANION GAP SERPL CALC-SCNC: 15 MMOL/L — SIGNIFICANT CHANGE UP (ref 5–17)
ANION GAP SERPL CALC-SCNC: 15 MMOL/L — SIGNIFICANT CHANGE UP (ref 5–17)
ANION GAP SERPL CALC-SCNC: 18 MMOL/L — HIGH (ref 5–17)
B-OH-BUTYR SERPL-SCNC: 2.2 MMOL/L — HIGH
BUN SERPL-MCNC: 13 MG/DL — SIGNIFICANT CHANGE UP (ref 7–23)
CALCIUM SERPL-MCNC: 9.3 MG/DL — SIGNIFICANT CHANGE UP (ref 8.4–10.5)
CALCIUM SERPL-MCNC: 9.5 MG/DL — SIGNIFICANT CHANGE UP (ref 8.4–10.5)
CALCIUM SERPL-MCNC: 9.9 MG/DL — SIGNIFICANT CHANGE UP (ref 8.4–10.5)
CHLORIDE SERPL-SCNC: 101 MMOL/L — SIGNIFICANT CHANGE UP (ref 96–108)
CHLORIDE SERPL-SCNC: 101 MMOL/L — SIGNIFICANT CHANGE UP (ref 96–108)
CHLORIDE SERPL-SCNC: 103 MMOL/L — SIGNIFICANT CHANGE UP (ref 96–108)
CHOLEST SERPL-MCNC: 134 MG/DL — SIGNIFICANT CHANGE UP (ref 10–199)
CO2 SERPL-SCNC: 21 MMOL/L — LOW (ref 22–31)
CO2 SERPL-SCNC: 22 MMOL/L — SIGNIFICANT CHANGE UP (ref 22–31)
CO2 SERPL-SCNC: 23 MMOL/L — SIGNIFICANT CHANGE UP (ref 22–31)
CREAT SERPL-MCNC: 0.62 MG/DL — SIGNIFICANT CHANGE UP (ref 0.5–1.3)
CREAT SERPL-MCNC: 0.7 MG/DL — SIGNIFICANT CHANGE UP (ref 0.5–1.3)
CREAT SERPL-MCNC: 0.73 MG/DL — SIGNIFICANT CHANGE UP (ref 0.5–1.3)
GAS PNL BLDV: SIGNIFICANT CHANGE UP
GGT SERPL-CCNC: 28 U/L — SIGNIFICANT CHANGE UP (ref 9–50)
GLUCOSE BLDC GLUCOMTR-MCNC: 143 MG/DL — HIGH (ref 70–99)
GLUCOSE BLDC GLUCOMTR-MCNC: 209 MG/DL — HIGH (ref 70–99)
GLUCOSE BLDC GLUCOMTR-MCNC: 214 MG/DL — HIGH (ref 70–99)
GLUCOSE BLDC GLUCOMTR-MCNC: 248 MG/DL — HIGH (ref 70–99)
GLUCOSE SERPL-MCNC: 172 MG/DL — HIGH (ref 70–99)
GLUCOSE SERPL-MCNC: 221 MG/DL — HIGH (ref 70–99)
GLUCOSE SERPL-MCNC: 254 MG/DL — HIGH (ref 70–99)
HCT VFR BLD CALC: 38.8 % — LOW (ref 39–50)
HCV AB S/CO SERPL IA: 0.1 S/CO — SIGNIFICANT CHANGE UP (ref 0–0.99)
HCV AB SERPL-IMP: SIGNIFICANT CHANGE UP
HDLC SERPL-MCNC: 19 MG/DL — LOW
HGB BLD-MCNC: 13.2 G/DL — SIGNIFICANT CHANGE UP (ref 13–17)
LIPID PNL WITH DIRECT LDL SERPL: 43 MG/DL — SIGNIFICANT CHANGE UP
MAGNESIUM SERPL-MCNC: 1.7 MG/DL — SIGNIFICANT CHANGE UP (ref 1.6–2.6)
MCHC RBC-ENTMCNC: 28.8 PG — SIGNIFICANT CHANGE UP (ref 27–34)
MCHC RBC-ENTMCNC: 34 GM/DL — SIGNIFICANT CHANGE UP (ref 32–36)
MCV RBC AUTO: 84.7 FL — SIGNIFICANT CHANGE UP (ref 80–100)
PHOSPHATE SERPL-MCNC: 4.2 MG/DL — SIGNIFICANT CHANGE UP (ref 2.5–4.5)
PLATELET # BLD AUTO: 175 K/UL — SIGNIFICANT CHANGE UP (ref 150–400)
POTASSIUM SERPL-MCNC: 3.5 MMOL/L — SIGNIFICANT CHANGE UP (ref 3.5–5.3)
POTASSIUM SERPL-MCNC: 3.8 MMOL/L — SIGNIFICANT CHANGE UP (ref 3.5–5.3)
POTASSIUM SERPL-MCNC: 4.2 MMOL/L — SIGNIFICANT CHANGE UP (ref 3.5–5.3)
POTASSIUM SERPL-SCNC: 3.5 MMOL/L — SIGNIFICANT CHANGE UP (ref 3.5–5.3)
POTASSIUM SERPL-SCNC: 3.8 MMOL/L — SIGNIFICANT CHANGE UP (ref 3.5–5.3)
POTASSIUM SERPL-SCNC: 4.2 MMOL/L — SIGNIFICANT CHANGE UP (ref 3.5–5.3)
RBC # BLD: 4.58 M/UL — SIGNIFICANT CHANGE UP (ref 4.2–5.8)
RBC # FLD: 12 % — SIGNIFICANT CHANGE UP (ref 10.3–14.5)
SODIUM SERPL-SCNC: 139 MMOL/L — SIGNIFICANT CHANGE UP (ref 135–145)
SODIUM SERPL-SCNC: 140 MMOL/L — SIGNIFICANT CHANGE UP (ref 135–145)
SODIUM SERPL-SCNC: 140 MMOL/L — SIGNIFICANT CHANGE UP (ref 135–145)
TOTAL CHOLESTEROL/HDL RATIO MEASUREMENT: 7.1 RATIO — SIGNIFICANT CHANGE UP (ref 3.4–9.6)
TRIGL SERPL-MCNC: 359 MG/DL — HIGH (ref 10–149)
VIT D25+D1,25 OH+D1,25 PNL SERPL-MCNC: 29.1 PG/ML — SIGNIFICANT CHANGE UP (ref 19.9–79.3)
WBC # BLD: 9.36 K/UL — SIGNIFICANT CHANGE UP (ref 3.8–10.5)
WBC # FLD AUTO: 9.36 K/UL — SIGNIFICANT CHANGE UP (ref 3.8–10.5)

## 2019-04-14 PROCEDURE — 99223 1ST HOSP IP/OBS HIGH 75: CPT | Mod: GC

## 2019-04-14 PROCEDURE — 99232 SBSQ HOSP IP/OBS MODERATE 35: CPT

## 2019-04-14 RX ORDER — NICOTINE POLACRILEX 2 MG
1 GUM BUCCAL DAILY
Qty: 0 | Refills: 0 | Status: DISCONTINUED | OUTPATIENT
Start: 2019-04-14 | End: 2019-04-16

## 2019-04-14 RX ORDER — GABAPENTIN 400 MG/1
300 CAPSULE ORAL THREE TIMES A DAY
Qty: 0 | Refills: 0 | Status: DISCONTINUED | OUTPATIENT
Start: 2019-04-14 | End: 2019-04-16

## 2019-04-14 RX ORDER — METOPROLOL TARTRATE 50 MG
25 TABLET ORAL
Qty: 0 | Refills: 0 | Status: DISCONTINUED | OUTPATIENT
Start: 2019-04-14 | End: 2019-04-16

## 2019-04-14 RX ORDER — INSULIN LISPRO 100/ML
9 VIAL (ML) SUBCUTANEOUS
Qty: 0 | Refills: 0 | Status: DISCONTINUED | OUTPATIENT
Start: 2019-04-14 | End: 2019-04-15

## 2019-04-14 RX ORDER — POLYETHYLENE GLYCOL 3350 17 G/17G
17 POWDER, FOR SOLUTION ORAL DAILY
Qty: 0 | Refills: 0 | Status: DISCONTINUED | OUTPATIENT
Start: 2019-04-14 | End: 2019-04-16

## 2019-04-14 RX ORDER — ATORVASTATIN CALCIUM 80 MG/1
40 TABLET, FILM COATED ORAL AT BEDTIME
Qty: 0 | Refills: 0 | Status: DISCONTINUED | OUTPATIENT
Start: 2019-04-14 | End: 2019-04-16

## 2019-04-14 RX ORDER — GLUCAGON INJECTION, SOLUTION 0.5 MG/.1ML
1 INJECTION, SOLUTION SUBCUTANEOUS ONCE
Qty: 0 | Refills: 0 | Status: DISCONTINUED | OUTPATIENT
Start: 2019-04-14 | End: 2019-04-16

## 2019-04-14 RX ORDER — ENOXAPARIN SODIUM 100 MG/ML
40 INJECTION SUBCUTANEOUS EVERY 24 HOURS
Qty: 0 | Refills: 0 | Status: DISCONTINUED | OUTPATIENT
Start: 2019-04-14 | End: 2019-04-16

## 2019-04-14 RX ORDER — GABAPENTIN 400 MG/1
300 CAPSULE ORAL
Qty: 0 | Refills: 0 | Status: DISCONTINUED | OUTPATIENT
Start: 2019-04-14 | End: 2019-04-14

## 2019-04-14 RX ORDER — INSULIN LISPRO 100/ML
VIAL (ML) SUBCUTANEOUS AT BEDTIME
Qty: 0 | Refills: 0 | Status: DISCONTINUED | OUTPATIENT
Start: 2019-04-14 | End: 2019-04-16

## 2019-04-14 RX ORDER — INSULIN LISPRO 100/ML
VIAL (ML) SUBCUTANEOUS
Qty: 0 | Refills: 0 | Status: DISCONTINUED | OUTPATIENT
Start: 2019-04-14 | End: 2019-04-16

## 2019-04-14 RX ORDER — IBUPROFEN 200 MG
400 TABLET ORAL EVERY 6 HOURS
Qty: 0 | Refills: 0 | Status: DISCONTINUED | OUTPATIENT
Start: 2019-04-14 | End: 2019-04-16

## 2019-04-14 RX ORDER — SODIUM CHLORIDE 9 MG/ML
1000 INJECTION, SOLUTION INTRAVENOUS
Qty: 0 | Refills: 0 | Status: DISCONTINUED | OUTPATIENT
Start: 2019-04-14 | End: 2019-04-16

## 2019-04-14 RX ORDER — INFLUENZA VIRUS VACCINE 15; 15; 15; 15 UG/.5ML; UG/.5ML; UG/.5ML; UG/.5ML
0.5 SUSPENSION INTRAMUSCULAR ONCE
Qty: 0 | Refills: 0 | Status: DISCONTINUED | OUTPATIENT
Start: 2019-04-14 | End: 2019-04-16

## 2019-04-14 RX ORDER — INSULIN LISPRO 100/ML
VIAL (ML) SUBCUTANEOUS EVERY 4 HOURS
Qty: 0 | Refills: 0 | Status: DISCONTINUED | OUTPATIENT
Start: 2019-04-14 | End: 2019-04-14

## 2019-04-14 RX ORDER — POTASSIUM CHLORIDE 20 MEQ
40 PACKET (EA) ORAL EVERY 4 HOURS
Qty: 0 | Refills: 0 | Status: COMPLETED | OUTPATIENT
Start: 2019-04-14 | End: 2019-04-14

## 2019-04-14 RX ORDER — ASPIRIN/CALCIUM CARB/MAGNESIUM 324 MG
81 TABLET ORAL DAILY
Qty: 0 | Refills: 0 | Status: DISCONTINUED | OUTPATIENT
Start: 2019-04-14 | End: 2019-04-16

## 2019-04-14 RX ORDER — CYCLOBENZAPRINE HYDROCHLORIDE 10 MG/1
10 TABLET, FILM COATED ORAL THREE TIMES A DAY
Qty: 0 | Refills: 0 | Status: DISCONTINUED | OUTPATIENT
Start: 2019-04-14 | End: 2019-04-16

## 2019-04-14 RX ORDER — MAGNESIUM SULFATE 500 MG/ML
1 VIAL (ML) INJECTION ONCE
Qty: 0 | Refills: 0 | Status: COMPLETED | OUTPATIENT
Start: 2019-04-14 | End: 2019-04-14

## 2019-04-14 RX ORDER — LANOLIN ALCOHOL/MO/W.PET/CERES
3 CREAM (GRAM) TOPICAL AT BEDTIME
Qty: 0 | Refills: 0 | Status: DISCONTINUED | OUTPATIENT
Start: 2019-04-14 | End: 2019-04-16

## 2019-04-14 RX ORDER — INSULIN GLARGINE 100 [IU]/ML
27 INJECTION, SOLUTION SUBCUTANEOUS
Qty: 0 | Refills: 0 | Status: DISCONTINUED | OUTPATIENT
Start: 2019-04-14 | End: 2019-04-15

## 2019-04-14 RX ORDER — CYCLOBENZAPRINE HYDROCHLORIDE 10 MG/1
10 TABLET, FILM COATED ORAL THREE TIMES A DAY
Qty: 0 | Refills: 0 | Status: DISCONTINUED | OUTPATIENT
Start: 2019-04-14 | End: 2019-04-14

## 2019-04-14 RX ORDER — SENNA PLUS 8.6 MG/1
2 TABLET ORAL AT BEDTIME
Qty: 0 | Refills: 0 | Status: DISCONTINUED | OUTPATIENT
Start: 2019-04-14 | End: 2019-04-16

## 2019-04-14 RX ORDER — DEXTROSE 50 % IN WATER 50 %
25 SYRINGE (ML) INTRAVENOUS ONCE
Qty: 0 | Refills: 0 | Status: DISCONTINUED | OUTPATIENT
Start: 2019-04-14 | End: 2019-04-16

## 2019-04-14 RX ORDER — CELECOXIB 200 MG/1
200 CAPSULE ORAL
Qty: 0 | Refills: 0 | Status: DISCONTINUED | OUTPATIENT
Start: 2019-04-14 | End: 2019-04-16

## 2019-04-14 RX ORDER — ACETAMINOPHEN 500 MG
1000 TABLET ORAL ONCE
Qty: 0 | Refills: 0 | Status: COMPLETED | OUTPATIENT
Start: 2019-04-14 | End: 2019-04-14

## 2019-04-14 RX ORDER — DEXTROSE 50 % IN WATER 50 %
15 SYRINGE (ML) INTRAVENOUS ONCE
Qty: 0 | Refills: 0 | Status: DISCONTINUED | OUTPATIENT
Start: 2019-04-14 | End: 2019-04-16

## 2019-04-14 RX ORDER — DEXTROSE 50 % IN WATER 50 %
12.5 SYRINGE (ML) INTRAVENOUS ONCE
Qty: 0 | Refills: 0 | Status: DISCONTINUED | OUTPATIENT
Start: 2019-04-14 | End: 2019-04-16

## 2019-04-14 RX ADMIN — Medication 1000 MILLIGRAM(S): at 19:37

## 2019-04-14 RX ADMIN — ATORVASTATIN CALCIUM 40 MILLIGRAM(S): 80 TABLET, FILM COATED ORAL at 21:55

## 2019-04-14 RX ADMIN — SENNA PLUS 2 TABLET(S): 8.6 TABLET ORAL at 21:55

## 2019-04-14 RX ADMIN — INSULIN GLARGINE 27 UNIT(S): 100 INJECTION, SOLUTION SUBCUTANEOUS at 17:26

## 2019-04-14 RX ADMIN — Medication 3 MILLIGRAM(S): at 21:57

## 2019-04-14 RX ADMIN — Medication 4: at 17:27

## 2019-04-14 RX ADMIN — Medication 4: at 00:40

## 2019-04-14 RX ADMIN — GABAPENTIN 300 MILLIGRAM(S): 400 CAPSULE ORAL at 21:55

## 2019-04-14 RX ADMIN — Medication 81 MILLIGRAM(S): at 11:13

## 2019-04-14 RX ADMIN — Medication 400 MILLIGRAM(S): at 05:00

## 2019-04-14 RX ADMIN — Medication 25 MILLIGRAM(S): at 07:25

## 2019-04-14 RX ADMIN — Medication 9 UNIT(S): at 17:27

## 2019-04-14 RX ADMIN — Medication 400 MILLIGRAM(S): at 18:33

## 2019-04-14 RX ADMIN — Medication 25 MILLIGRAM(S): at 17:29

## 2019-04-14 RX ADMIN — Medication 1 TABLET(S): at 11:13

## 2019-04-14 RX ADMIN — Medication 400 MILLIGRAM(S): at 04:12

## 2019-04-14 RX ADMIN — Medication 40 MILLIEQUIVALENT(S): at 08:13

## 2019-04-14 RX ADMIN — Medication 4: at 12:13

## 2019-04-14 RX ADMIN — GABAPENTIN 300 MILLIGRAM(S): 400 CAPSULE ORAL at 13:07

## 2019-04-14 RX ADMIN — Medication 1 PATCH: at 19:38

## 2019-04-14 RX ADMIN — ENOXAPARIN SODIUM 40 MILLIGRAM(S): 100 INJECTION SUBCUTANEOUS at 07:26

## 2019-04-14 RX ADMIN — Medication 2: at 08:41

## 2019-04-14 RX ADMIN — Medication 400 MILLIGRAM(S): at 21:57

## 2019-04-14 RX ADMIN — CELECOXIB 200 MILLIGRAM(S): 200 CAPSULE ORAL at 16:09

## 2019-04-14 RX ADMIN — Medication 1 PATCH: at 11:13

## 2019-04-14 RX ADMIN — GABAPENTIN 300 MILLIGRAM(S): 400 CAPSULE ORAL at 07:25

## 2019-04-14 RX ADMIN — Medication 400 MILLIGRAM(S): at 01:12

## 2019-04-14 RX ADMIN — SODIUM CHLORIDE 2000 MILLILITER(S): 9 INJECTION INTRAMUSCULAR; INTRAVENOUS; SUBCUTANEOUS at 01:00

## 2019-04-14 RX ADMIN — CELECOXIB 200 MILLIGRAM(S): 200 CAPSULE ORAL at 17:00

## 2019-04-14 RX ADMIN — Medication 9 UNIT(S): at 12:13

## 2019-04-14 RX ADMIN — Medication 400 MILLIGRAM(S): at 22:42

## 2019-04-14 RX ADMIN — Medication 40 MILLIEQUIVALENT(S): at 04:12

## 2019-04-14 RX ADMIN — Medication 100 GRAM(S): at 08:11

## 2019-04-14 NOTE — DIETITIAN INITIAL EVALUATION ADULT. - PROBLEM SELECTOR PLAN 1
- On admission, DKA (AG is 20, BG in 500's, BHB 2.8 with moderate ketones in urine).   - s/p 10u humalog and lantus 25u - now with downtrending FS and decreasing AG.  - will keep NPO for now until gap closed   - c/w aggressive IVF hydration  - monitor FS q6h for now, place on sliding scale insulin    - Endocrine recs appreciated, full note to follow in AM

## 2019-04-14 NOTE — DIETITIAN INITIAL EVALUATION ADULT. - NS AS NUTRI INTERV ED CONTENT
Other (specify)/pt given diet education on healthy vs unhealthy wt loss, eating consistently and not replacing meals with drinks./Recommended modifications Recommended modifications/pt given diet education on healthy vs unhealthy wt loss, eating consistently and not replacing meals with drinks, eating adequate protein./Other (specify)

## 2019-04-14 NOTE — DIETITIAN INITIAL EVALUATION ADULT. - PROBLEM SELECTOR PLAN 4
- Patient on metoprolol suc 100mg daily, will do metoprolol 25mg BID for now given soft BP  - monitor BP routinely

## 2019-04-14 NOTE — DIETITIAN INITIAL EVALUATION ADULT. - FACTORS AFF FOOD INTAKE
other (specify)/Pt has not eaten since admission, was just now given diet order ([facility] and DASH TLC) and reports he is very hungry

## 2019-04-14 NOTE — DIETITIAN INITIAL EVALUATION ADULT. - ADHERENCE
n/a/Pt was not following any therapeutic diet PTA, however is very nutritionally focused, eats mostly lean meat, egg whites, low carb. Prior to back surgery pt was extremely active. Pt is newly diagnosed with T2DM (upon this admission) and was largely unaware of consistent CHO dietary recommendations Pt cooks for himself, does not add salt to his food, does not follow any formal therapeutic diet PTA, however is very nutritionally focused, eats mostly lean meat, egg whites, low carb. Prior to back surgery pt was extremely active. Pt is newly diagnosed with T2DM (upon this admission) and was largely unaware of consistent CHO dietary recommendations/n/a

## 2019-04-14 NOTE — DIETITIAN INITIAL EVALUATION ADULT. - PROBLEM SELECTOR PLAN 2
A1c 12.4 - this would be new diagnosis of DM for pt; likely type 2 vs latent diabetes in adult; was never taking steroids after surgery despite prescriptions, unlikely steroid induced    f/u endocrine recs regarding whether antibody testing required   insulin and FS monitoring as above for now    will need to be discharged on insulin, will require diabetic and insulin teaching, will need f/u as oupt with PMD vs endocrine  no protein in urine, no need to start ACEi/ARB for now

## 2019-04-14 NOTE — CONSULT NOTE ADULT - ASSESSMENT
58 year old man with HTN, HLD, here with DKA in setting of new DM, suspect ketosis prone DM2 but would rule out CORRINE/DM1. BG goal is 100-180 mg.

## 2019-04-14 NOTE — PROGRESS NOTE ADULT - ATTENDING COMMENTS
Patient seen and examined.  s/p DKA on Humalog 9 units TID and Lantus 27 units qhs with moderate correction scale qac and qhs as per endocrine.   - consistent carb diet  -Needs Insulin pen teaching.

## 2019-04-14 NOTE — CONSULT NOTE ADULT - PROBLEM SELECTOR RECOMMENDATION 9
- now resolved  - would resume diet - can start Humalog 9 units TID, Lantus 27 units at 6 pm, with moderate correction scale qac and qhs  - consistent carb diet  - will follow - now resolved  - would resume diet - can start Humalog 9 units TID, Lantus 27 units at 6 pm, with moderate correction scale qac and qhs  - consistent carb diet  - will follow  -discussed with primary team

## 2019-04-14 NOTE — DIETITIAN INITIAL EVALUATION ADULT. - PROBLEM SELECTOR PLAN 5
- On rosuvastatin 5mg at home, therapeutic interchange to atorvastatin.   - Started on moderate intensity statin given significant family history and dx of DM  - Lipid panel in the am. Consider increased dose of rosuvastatin upon discharge.

## 2019-04-14 NOTE — DIETITIAN INITIAL EVALUATION ADULT. - SIGNS/SYMPTOMS
wt loss of 30 lb in 2 months, inadequate intake x 2 months, moderate/severe muscle wasting New diagnosis, Pt's questions and diet recall New diagnosis, Pt's questions and diet recall, A1c 12.5% wt loss of 12% in 2 months, intake <50% x 2 months, moderate/severe muscle wasting
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
## 2019-04-14 NOTE — CONSULT NOTE ADULT - PROBLEM SELECTOR RECOMMENDATION 2
- suspect patient with ketosis prone DM but would rule out CORRINE/DM1 - check islet cell and THIERNO ab  - can resume diet now - start Humalog 9 units TID and Lantus 27 units qhs with moderate correction scale qac and qhs  - consistent carb diet  - will follow  - recommend obtain nutrition consult  - for dc: given DKA, he will require insulin on discharge and will require insulin pen teaching. Tentative plan is d/c on basal bolus insulin, doses to be determined

## 2019-04-14 NOTE — DIETITIAN INITIAL EVALUATION ADULT. - NUTRITION INTERVENTION
Meals and Snack/Nutrition Education/Medical Food Supplements Nutrition Education Meals and Snack/Nutrition Education/Collaboration and Referral of Nutrition Care/Medical Food Supplements
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
## 2019-04-14 NOTE — CHART NOTE - NSCHARTNOTEFT_GEN_A_CORE
Patient has taken cyclobenzaprine 10mg from home twice while in the hospital. Advised to stop taking ALL home medications. Will reschedule gabapentin to 8am given possibly interaction. Will given acetaminophen and ibuprofen for pain now.     Roby Fregoso PGY-2

## 2019-04-14 NOTE — DIETITIAN INITIAL EVALUATION ADULT. - PROBLEM SELECTOR PLAN 3
- Chronic back pain below surgical site in the lumbar area.   - CK wnl. TSH wnl. Will give acetaminophen IV x 1.   - will titrate gabapentin to 300mg BID as current dose has not improved pts pain  - Ibuprofen 400mg q6h prn   - Potentially restart cyclobenzaprine if not improving. Patient is not responsive to opiods, will defer for now   - PT eval   - would benefit from pain management f/u on discharge    - Lidocaine patch  - PT ordered.

## 2019-04-14 NOTE — DIETITIAN INITIAL EVALUATION ADULT. - PROBLEM SELECTOR PLAN 8
- Will place on nicotine patch while in the hospital.  - Consider smoking cessation medication upon discharge.

## 2019-04-14 NOTE — CHART NOTE - NSCHARTNOTEFT_GEN_A_CORE
Patient with a1c of 12, and high AG, elevated blood sugar.     Will increase mod sliding scale and FS to q4h.    Roby Fregoso

## 2019-04-14 NOTE — DIETITIAN INITIAL EVALUATION ADULT. - PHYSICAL APPEARANCE
other (specify)/pt with BMI of 28.4, large and muscular, however with recent significant wt loss.  Nutrition focused physical exam conducted with patient consent. Severe muscle wasting at temples, mild wasting at clavicles, moderate wasting at Patellar region.

## 2019-04-14 NOTE — CONSULT NOTE ADULT - ATTENDING COMMENTS
Patient seen and examined. Agree with note as above with addendum: spoke with RN Thalia and asked her to pass on to the night nurse to begin teaching the for bs testing and insulin pen technique. Pt does not like needles after a bad experience with a IV placement in the past. He understands that he will need it as his pancreas is not currently working.   He can f/u at our practice as an outpatient.  Zeinab Mcgregor MD  Pager: [phone]  Nights and Weekends: [phone]

## 2019-04-14 NOTE — DIETITIAN INITIAL EVALUATION ADULT. - OTHER INFO
Pt seen as consult o SU.  Pt reports weight loss of 30 lb in 2 months, UWB of 250lb.  H&P from 2/2019 records wt at 251 lb. Pt was drinking copious amounts of soda (2 L/day) to fill up instead of eating.  PT had assumed it was diet soda, but now thinks it may have been regular but caffein free.  Pt was diagnosed with T2DM on this admission, yesterday, however believes that this is a mistake. No c/o nausea, diarrhea, vomiting, difficulty chewing/swallowing. Pt feels consitpated, however it may be due to not eating for 2 days. Last BM 4/13, pt on bowel regiment. Pt was amenable to supplement drinks and diet education at this time. Pt seen as consult on [facility].  Pt reports weight loss of 30 lb in 2 months, UBW of 250lb.  H&P from 2/2019 records wt at 251 lb. Pt was drinking copious amounts of soda (2 L/day) to fill up instead of eating.  PT had assumed it was diet soda, but now thinks it may have been regular but caffeine free.  Pt was diagnosed with T2DM on this admission, yesterday, however believes that this is a mistake. No c/o nausea, diarrhea, vomiting, difficulty chewing/swallowing. Pt feels consitpated, however it may be due to not eating for 2 days. Last BM 4/13, pt on bowel regiment. Pt was amenable to supplement drinks and diet education at this time. Endocrine following

## 2019-04-14 NOTE — DIETITIAN INITIAL EVALUATION ADULT. - PT NOT SOURCE
pt reports at baseline he would eat 2 meals/day, however since his back surgery 2 months ago, he began replacing food with soda (he is unsure if it was diet).  Pt was worried about gaining wt due to his new sedentary lifestyle post surgery and was attempting to loose wt.  NKFA, Pt was taking Niacin, Vitamin C, B complex, Testosterone, 2-Carb ( a carb burner) Flax seed oil a wt loss supplement (cannot recall the name)

## 2019-04-14 NOTE — PROGRESS NOTE ADULT - SUBJECTIVE AND OBJECTIVE BOX
Ras Lester MD MEE  Internal Medicine PGY-1  Pager [facility]: [phone]; [facility] [id number]    Patient is a 58y old  Male who presents with a chief complaint of Hyperglycemia (2019 22:29)      SUBJECTIVE/OVERNIGHT EVENTS   No acute events overnight. Patient tolerating meals, without n/v. Reports having daily BM. Denies fevers, chills, SOB. ROS otherwise negative.     OBJECTIVE  Vital Signs Last 24 Hrs  T(C): 36.3 (2019 03:36), Max: 36.8 (2019 19:10)  T(F): 97.3 (2019 03:36), Max: 98.2 (2019 19:10)  HR: 66 (2019 03:36) (65 - 88)  BP: 107/70 (2019 03:36) (107/70 - 135/82)  BP(mean): --  RR: 18 (2019 03:36) (16 - 20)  SpO2: 95% (2019 03:36) (95% - 98%)    I&O's Summary      PHYSICAL EXAM:  GENERAL: NAD, well-developed  HEAD:  Atraumatic, Normocephalic  EYES: EOMI, conjunctiva and sclera clear  NECK: Supple, No JVD  CHEST/LUNG: Clear to auscultation bilaterally; No wheeze  HEART: Regular rate and rhythm; No murmurs, rubs, or gallops  ABDOMEN: Soft, Nontender, Nondistended; Bowel sounds present  EXTREMITIES:  2+ Peripheral Pulses, No clubbing, cyanosis, or edema  PSYCH: AAOx3  NEUROLOGY: non-focal  SKIN: No rashes or lesions    LABS                        16.4   9.9   )-----------( 233      ( 2019 15:43 )             44.9     -14    139  |  101  |  13  ----------------------------<  172<H>  3.8   |  23  |  0.70  -14    140  |  101  |  13  ----------------------------<  221<H>  3.5   |  21<L>  |  0.73    Ca    9.5      2019 05:44  Ca    9.9      2019 00:26  Phos  4.2       Mg     1.7         TPro  7.9  /  Alb  4.4  /  TBili  0.4  /  DBili  x   /  AST  17  /  ALT  25  /  AlkPhos  143<H>      CAPILLARY BLOOD GLUCOSE      POCT Blood Glucose.: 209 mg/dL (2019 00:31)  POCT Blood Glucose.: 210 mg/dL (2019 22:37)  POCT Blood Glucose.: 369 mg/dL (2019 18:38)  POCT Blood Glucose.: 530 mg/dL (2019 16:03)  POCT Blood Glucose.: 492 mg/dL (2019 16:02)       2019 15:43 Troponin x     / CK 39 U/L / CKMB x     / CKMB Units x              19 @ 19:00 - VBG - pH: 7.39  | pCO2: 36    | pO2: 62    | Lactate: 1.4    19 @ 16:25 - VBG - pH: 7.35  | pCO2: 44    | pO2: 34    | Lactate: 2.1        Urinalysis Basic - ( 2019 16:20 )    Color: Yellow / Appearance: Clear / S.039 / pH: x  Gluc: x / Ketone: Moderate  / Bili: Negative / Urobili: Negative   Blood: x / Protein: Negative / Nitrite: Negative   Leuk Esterase: Negative / RBC: 1 /hpf / WBC 1 /HPF   Sq Epi: x / Non Sq Epi: 0 /hpf / Bacteria: Negative        RADIOLOGY & ADDITIONAL TESTS:    MEDICATIONS  (STANDING):  aspirin enteric coated 81 milliGRAM(s) Oral daily  atorvastatin 40 milliGRAM(s) Oral at bedtime  dextrose 5%. 1000 milliLiter(s) (50 mL/Hr) IV Continuous <Continuous>  dextrose 50% Injectable 12.5 Gram(s) IV Push once  dextrose 50% Injectable 25 Gram(s) IV Push once  dextrose 50% Injectable 25 Gram(s) IV Push once  enoxaparin Injectable 40 milliGRAM(s) SubCutaneous every 24 hours  gabapentin 300 milliGRAM(s) Oral two times a day  influenza   Vaccine 0.5 milliLiter(s) IntraMuscular once  insulin lispro (HumaLOG) corrective regimen sliding scale   SubCutaneous every 4 hours  metoprolol tartrate 25 milliGRAM(s) Oral two times a day  multivitamin 1 Tablet(s) Oral daily  nicotine -   7 mG/24Hr(s) Patch 1 patch Transdermal daily  polyethylene glycol 3350 17 Gram(s) Oral daily  senna 2 Tablet(s) Oral at bedtime  sodium chloride 0.9%. 1000 milliLiter(s) (175 mL/Hr) IV Continuous <Continuous>    MEDICATIONS  (PRN):  dextrose 40% Gel 15 Gram(s) Oral once PRN Blood Glucose LESS THAN 70 milliGRAM(s)/deciliter  glucagon  Injectable 1 milliGRAM(s) IntraMuscular once PRN Glucose LESS THAN 70 milligrams/deciliter  ibuprofen  Tablet. 400 milliGRAM(s) Oral every 6 hours PRN Mild Pain (1 - 3), Moderate Pain (4 - 6), Severe Pain (7 - 10)  melatonin 3 milliGRAM(s) Oral at bedtime PRN Insomnia Ras Lester MD MEE  Internal Medicine PGY-1  Pager [facility]: [phone]; [facility] [id number]    Patient is a 58y old  Male who presents with a chief complaint of Hyperglycemia (2019 22:29)    SUBJECTIVE/OVERNIGHT EVENTS   No acute events overnight. Reports that his lower back pain kept him up all night. Patient tolerating meals, without n/v. Reports having daily BM. Denies fevers, chills, SOB. ROS otherwise negative.     OBJECTIVE  Vital Signs Last 24 Hrs  T(C): 36.3 (2019 03:36), Max: 36.8 (2019 19:10)  T(F): 97.3 (2019 03:36), Max: 98.2 (2019 19:10)  HR: 66 (2019 03:36) (65 - 88)  BP: 107/70 (2019 03:36) (107/70 - 135/82)  BP(mean): --  RR: 18 (2019 03:36) (16 - 20)  SpO2: 95% (2019 03:36) (95% - 98%)    PHYSICAL EXAM:  GENERAL: NAD, well-developed  HEAD:  Atraumatic, Normocephalic  EYES: EOMI, conjunctiva and sclera clear  NECK: Supple, No JVD  CHEST/LUNG: Clear to auscultation bilaterally; No wheeze  HEART: Regular rate and rhythm; No murmurs, rubs, or gallops  ABDOMEN: Soft, Nontender, Nondistended; Bowel sounds present  EXTREMITIES:  2+ Peripheral Pulses, No clubbing, cyanosis, or edema  PSYCH: AAOx3  NEUROLOGY: non-focal, SILT b/l LE/UE, 5/5 muscle strength UE/LE b/l   SKIN: No rashes or lesions    LABS                        16.4   9.9   )-----------( 233      ( 2019 15:43 )             44.9     -14    139  |  101  |  13  ----------------------------<  172<H>  3.8   |  23  |  0.70  -14    140  |  101  |  13  ----------------------------<  221<H>  3.5   |  21<L>  |  0.73    Ca    9.5      2019 05:44  Ca    9.9      2019 00:26  Phos  4.2       Mg     1.7         TPro  7.9  /  Alb  4.4  /  TBili  0.4  /  DBili  x   /  AST  17  /  ALT  25  /  AlkPhos  143<H>      CAPILLARY BLOOD GLUCOSE  POCT Blood Glucose.: 209 mg/dL (2019 00:31)  POCT Blood Glucose.: 210 mg/dL (2019 22:37)  POCT Blood Glucose.: 369 mg/dL (2019 18:38)  POCT Blood Glucose.: 530 mg/dL (2019 16:03)  POCT Blood Glucose.: 492 mg/dL (2019 16:02)     2019 15:43 Troponin x     / CK 39 U/L / CKMB x     / CKMB Units x      19 @ 19:00 - VBG - pH: 7.39  | pCO2: 36    | pO2: 62    | Lactate: 1.4    19 @ 16:25 - VBG - pH: 7.35  | pCO2: 44    | pO2: 34    | Lactate: 2.1      Urinalysis Basic - ( 2019 16:20 )  Color: Yellow / Appearance: Clear / S.039 / pH: x  Gluc: x / Ketone: Moderate  / Bili: Negative / Urobili: Negative   Blood: x / Protein: Negative / Nitrite: Negative   Leuk Esterase: Negative / RBC: 1 /hpf / WBC 1 /HPF   Sq Epi: x / Non Sq Epi: 0 /hpf / Bacteria: Negative    MEDICATIONS  (STANDING):  aspirin enteric coated 81 milliGRAM(s) Oral daily  atorvastatin 40 milliGRAM(s) Oral at bedtime  dextrose 5%. 1000 milliLiter(s) (50 mL/Hr) IV Continuous <Continuous>  dextrose 50% Injectable 12.5 Gram(s) IV Push once  dextrose 50% Injectable 25 Gram(s) IV Push once  dextrose 50% Injectable 25 Gram(s) IV Push once  enoxaparin Injectable 40 milliGRAM(s) SubCutaneous every 24 hours  gabapentin 300 milliGRAM(s) Oral two times a day  influenza   Vaccine 0.5 milliLiter(s) IntraMuscular once  insulin lispro (HumaLOG) corrective regimen sliding scale   SubCutaneous every 4 hours  metoprolol tartrate 25 milliGRAM(s) Oral two times a day  multivitamin 1 Tablet(s) Oral daily  nicotine -   7 mG/24Hr(s) Patch 1 patch Transdermal daily  polyethylene glycol 3350 17 Gram(s) Oral daily  senna 2 Tablet(s) Oral at bedtime  sodium chloride 0.9%. 1000 milliLiter(s) (175 mL/Hr) IV Continuous <Continuous>    MEDICATIONS  (PRN):  dextrose 40% Gel 15 Gram(s) Oral once PRN Blood Glucose LESS THAN 70 milliGRAM(s)/deciliter  glucagon  Injectable 1 milliGRAM(s) IntraMuscular once PRN Glucose LESS THAN 70 milligrams/deciliter  ibuprofen  Tablet. 400 milliGRAM(s) Oral every 6 hours PRN Mild Pain (1 - 3), Moderate Pain (4 - 6), Severe Pain (7 - 10)  melatonin 3 milliGRAM(s) Oral at bedtime PRN Insomnia

## 2019-04-14 NOTE — CHART NOTE - NSCHARTNOTEFT_GEN_A_CORE
Upon Nutritional Assessment by the Registered Dietitian your patient was determined to meet criteria / has evidence of the following diagnosis/diagnoses:          [ ]  Mild Protein Calorie Malnutrition        [ ]  Moderate Protein Calorie Malnutrition        [ x] Severe Protein Calorie Malnutrition        [ ] Unspecified Protein Calorie Malnutrition        [ ] Underweight / BMI <19        [ ] Morbid Obesity / BMI > 40      Findings as based on:  [ ] Comprehensive nutrition assessment   [ ] Nutrition Focused Physical Exam  [x ] Other: wt loss of 12% in 2 months, intake <50% x 2 months, moderate/severe muscle wasting        Nutrition Plan/Recommendations:    see initial nutrition assessment for recommendations     RD to remain available and follow-up as medically appropriate.     PROVIDER Section:     By signing this assessment you are acknowledging and agree with the diagnosis/diagnoses assigned by the Registered Dietitian    Comments:

## 2019-04-15 ENCOUNTER — TRANSCRIPTION ENCOUNTER (OUTPATIENT)
Age: 59
End: 2019-04-15

## 2019-04-15 LAB
ANION GAP SERPL CALC-SCNC: 15 MMOL/L — SIGNIFICANT CHANGE UP (ref 5–17)
BUN SERPL-MCNC: 10 MG/DL — SIGNIFICANT CHANGE UP (ref 7–23)
CALCIUM SERPL-MCNC: 9.6 MG/DL — SIGNIFICANT CHANGE UP (ref 8.4–10.5)
CHLORIDE SERPL-SCNC: 102 MMOL/L — SIGNIFICANT CHANGE UP (ref 96–108)
CO2 SERPL-SCNC: 24 MMOL/L — SIGNIFICANT CHANGE UP (ref 22–31)
CREAT SERPL-MCNC: 0.62 MG/DL — SIGNIFICANT CHANGE UP (ref 0.5–1.3)
GLUCOSE BLDC GLUCOMTR-MCNC: 137 MG/DL — HIGH (ref 70–99)
GLUCOSE BLDC GLUCOMTR-MCNC: 163 MG/DL — HIGH (ref 70–99)
GLUCOSE BLDC GLUCOMTR-MCNC: 180 MG/DL — HIGH (ref 70–99)
GLUCOSE BLDC GLUCOMTR-MCNC: 187 MG/DL — HIGH (ref 70–99)
GLUCOSE BLDC GLUCOMTR-MCNC: 197 MG/DL — HIGH (ref 70–99)
GLUCOSE SERPL-MCNC: 153 MG/DL — HIGH (ref 70–99)
HCT VFR BLD CALC: 44.7 % — SIGNIFICANT CHANGE UP (ref 39–50)
HGB BLD-MCNC: 14.7 G/DL — SIGNIFICANT CHANGE UP (ref 13–17)
MAGNESIUM SERPL-MCNC: 1.7 MG/DL — SIGNIFICANT CHANGE UP (ref 1.6–2.6)
MCHC RBC-ENTMCNC: 28.7 PG — SIGNIFICANT CHANGE UP (ref 27–34)
MCHC RBC-ENTMCNC: 32.9 GM/DL — SIGNIFICANT CHANGE UP (ref 32–36)
MCV RBC AUTO: 87.3 FL — SIGNIFICANT CHANGE UP (ref 80–100)
PHOSPHATE SERPL-MCNC: 3.7 MG/DL — SIGNIFICANT CHANGE UP (ref 2.5–4.5)
PLATELET # BLD AUTO: 179 K/UL — SIGNIFICANT CHANGE UP (ref 150–400)
POTASSIUM SERPL-MCNC: 4.1 MMOL/L — SIGNIFICANT CHANGE UP (ref 3.5–5.3)
POTASSIUM SERPL-SCNC: 4.1 MMOL/L — SIGNIFICANT CHANGE UP (ref 3.5–5.3)
RBC # BLD: 5.12 M/UL — SIGNIFICANT CHANGE UP (ref 4.2–5.8)
RBC # FLD: 11.7 % — SIGNIFICANT CHANGE UP (ref 10.3–14.5)
SODIUM SERPL-SCNC: 141 MMOL/L — SIGNIFICANT CHANGE UP (ref 135–145)
WBC # BLD: 6.9 K/UL — SIGNIFICANT CHANGE UP (ref 3.8–10.5)
WBC # FLD AUTO: 6.9 K/UL — SIGNIFICANT CHANGE UP (ref 3.8–10.5)

## 2019-04-15 PROCEDURE — 99232 SBSQ HOSP IP/OBS MODERATE 35: CPT | Mod: GC

## 2019-04-15 PROCEDURE — 99232 SBSQ HOSP IP/OBS MODERATE 35: CPT

## 2019-04-15 RX ORDER — ENOXAPARIN SODIUM 100 MG/ML
27 INJECTION SUBCUTANEOUS
Qty: 30 | Refills: 0 | OUTPATIENT
Start: 2019-04-15 | End: 2019-05-14

## 2019-04-15 RX ORDER — INSULIN GLARGINE 100 [IU]/ML
25 INJECTION, SOLUTION SUBCUTANEOUS
Qty: 0 | Refills: 0 | Status: DISCONTINUED | OUTPATIENT
Start: 2019-04-15 | End: 2019-04-15

## 2019-04-15 RX ORDER — INSULIN GLARGINE 100 [IU]/ML
25 INJECTION, SOLUTION SUBCUTANEOUS
Qty: 0 | Refills: 0 | Status: DISCONTINUED | OUTPATIENT
Start: 2019-04-16 | End: 2019-04-16

## 2019-04-15 RX ORDER — ACETAMINOPHEN 500 MG
650 TABLET ORAL ONCE
Qty: 0 | Refills: 0 | Status: COMPLETED | OUTPATIENT
Start: 2019-04-15 | End: 2019-04-15

## 2019-04-15 RX ORDER — INSULIN LISPRO 100/ML
9 VIAL (ML) SUBCUTANEOUS
Qty: 30 | Refills: 0 | OUTPATIENT
Start: 2019-04-15 | End: 2019-05-14

## 2019-04-15 RX ORDER — INSULIN LISPRO 100/ML
8 VIAL (ML) SUBCUTANEOUS
Qty: 0 | Refills: 0 | Status: DISCONTINUED | OUTPATIENT
Start: 2019-04-15 | End: 2019-04-16

## 2019-04-15 RX ORDER — INSULIN ASPART 100 [IU]/ML
9 INJECTION, SOLUTION SUBCUTANEOUS
Qty: 30 | Refills: 0 | OUTPATIENT
Start: 2019-04-15 | End: 2019-05-14

## 2019-04-15 RX ORDER — ACETAMINOPHEN 500 MG
1000 TABLET ORAL ONCE
Qty: 0 | Refills: 0 | Status: COMPLETED | OUTPATIENT
Start: 2019-04-15 | End: 2019-04-15

## 2019-04-15 RX ORDER — INSULIN GLARGINE 100 [IU]/ML
27 INJECTION, SOLUTION SUBCUTANEOUS
Qty: 30 | Refills: 0 | OUTPATIENT
Start: 2019-04-15 | End: 2019-05-14

## 2019-04-15 RX ADMIN — Medication 1 PATCH: at 12:01

## 2019-04-15 RX ADMIN — Medication 1 PATCH: at 07:00

## 2019-04-15 RX ADMIN — Medication 25 MILLIGRAM(S): at 05:24

## 2019-04-15 RX ADMIN — Medication 81 MILLIGRAM(S): at 12:00

## 2019-04-15 RX ADMIN — Medication 2: at 12:45

## 2019-04-15 RX ADMIN — INSULIN GLARGINE 27 UNIT(S): 100 INJECTION, SOLUTION SUBCUTANEOUS at 17:18

## 2019-04-15 RX ADMIN — ENOXAPARIN SODIUM 40 MILLIGRAM(S): 100 INJECTION SUBCUTANEOUS at 09:40

## 2019-04-15 RX ADMIN — Medication 400 MILLIGRAM(S): at 05:24

## 2019-04-15 RX ADMIN — Medication 1 TABLET(S): at 12:01

## 2019-04-15 RX ADMIN — GABAPENTIN 300 MILLIGRAM(S): 400 CAPSULE ORAL at 21:49

## 2019-04-15 RX ADMIN — Medication 9 UNIT(S): at 17:18

## 2019-04-15 RX ADMIN — CYCLOBENZAPRINE HYDROCHLORIDE 10 MILLIGRAM(S): 10 TABLET, FILM COATED ORAL at 09:40

## 2019-04-15 RX ADMIN — CELECOXIB 200 MILLIGRAM(S): 200 CAPSULE ORAL at 20:30

## 2019-04-15 RX ADMIN — Medication 9 UNIT(S): at 08:16

## 2019-04-15 RX ADMIN — CELECOXIB 200 MILLIGRAM(S): 200 CAPSULE ORAL at 20:00

## 2019-04-15 RX ADMIN — Medication 3 MILLIGRAM(S): at 21:48

## 2019-04-15 RX ADMIN — Medication 1 PATCH: at 11:59

## 2019-04-15 RX ADMIN — GABAPENTIN 300 MILLIGRAM(S): 400 CAPSULE ORAL at 13:50

## 2019-04-15 RX ADMIN — Medication 9 UNIT(S): at 12:45

## 2019-04-15 RX ADMIN — Medication 2: at 08:16

## 2019-04-15 RX ADMIN — Medication 400 MILLIGRAM(S): at 23:00

## 2019-04-15 RX ADMIN — ATORVASTATIN CALCIUM 40 MILLIGRAM(S): 80 TABLET, FILM COATED ORAL at 21:49

## 2019-04-15 RX ADMIN — GABAPENTIN 300 MILLIGRAM(S): 400 CAPSULE ORAL at 05:25

## 2019-04-15 RX ADMIN — CYCLOBENZAPRINE HYDROCHLORIDE 10 MILLIGRAM(S): 10 TABLET, FILM COATED ORAL at 21:49

## 2019-04-15 RX ADMIN — Medication 1000 MILLIGRAM(S): at 23:15

## 2019-04-15 RX ADMIN — Medication 25 MILLIGRAM(S): at 18:00

## 2019-04-15 RX ADMIN — SENNA PLUS 2 TABLET(S): 8.6 TABLET ORAL at 21:48

## 2019-04-15 NOTE — PROGRESS NOTE ADULT - SUBJECTIVE AND OBJECTIVE BOX
Chief Complaint/Follow-up on: DKA/new-onset DM  Subjective: Patient is anxious about insulin but notes that the needle was smaller than he expected. He would like to resume weight lifting and showed me pictures of how fit he used to be. We discussed a CGM which he likes but concerned that it will show when he wears wife beaters or no shirt at all.   +Good appetite/po intake.     MEDICATIONS  (STANDING):  aspirin enteric coated 81 milliGRAM(s) Oral daily  atorvastatin 40 milliGRAM(s) Oral at bedtime  dextrose 5%. 1000 milliLiter(s) (50 mL/Hr) IV Continuous <Continuous>  dextrose 50% Injectable 12.5 Gram(s) IV Push once  dextrose 50% Injectable 25 Gram(s) IV Push once  dextrose 50% Injectable 25 Gram(s) IV Push once  enoxaparin Injectable 40 milliGRAM(s) SubCutaneous every 24 hours  gabapentin 300 milliGRAM(s) Oral three times a day  influenza   Vaccine 0.5 milliLiter(s) IntraMuscular once  insulin glargine Injectable (LANTUS) 27 Unit(s) SubCutaneous <User Schedule>  insulin lispro (HumaLOG) corrective regimen sliding scale   SubCutaneous three times a day before meals  insulin lispro (HumaLOG) corrective regimen sliding scale   SubCutaneous at bedtime  insulin lispro Injectable (HumaLOG) 9 Unit(s) SubCutaneous three times a day before meals  metoprolol tartrate 25 milliGRAM(s) Oral two times a day  multivitamin 1 Tablet(s) Oral daily  nicotine -   7 mG/24Hr(s) Patch 1 patch Transdermal daily  polyethylene glycol 3350 17 Gram(s) Oral daily  senna 2 Tablet(s) Oral at bedtime    MEDICATIONS  (PRN):  celecoxib 200 milliGRAM(s) Oral two times a day with meals PRN muscle spasm and pain  cyclobenzaprine 10 milliGRAM(s) Oral three times a day PRN Muscle Spasm  dextrose 40% Gel 15 Gram(s) Oral once PRN Blood Glucose LESS THAN 70 milliGRAM(s)/deciliter  glucagon  Injectable 1 milliGRAM(s) IntraMuscular once PRN Glucose LESS THAN 70 milligrams/deciliter  ibuprofen  Tablet. 400 milliGRAM(s) Oral every 6 hours PRN Mild Pain (1 - 3), Moderate Pain (4 - 6), Severe Pain (7 - 10)  melatonin 3 milliGRAM(s) Oral at bedtime PRN Insomnia      PHYSICAL EXAM:  VITALS: T(C): 36.9 (04-15-19 @ 12:14)  T(F): 98.5 (04-15-19 @ 12:14), Max: 98.5 (04-15-19 @ 12:14)  HR: 83 (04-15-19 @ 17:58) (64 - 83)  BP: 110/70 (04-15-19 @ 17:58) (108/69 - 118/78)  RR:  (17 - 18)  SpO2:  (96% - 98%)  Wt(kg): --  GENERAL: NAD, well-groomed, well-developed  HEENT:  Atraumatic, Normocephalic, moist mucous membranes  RESPIRATORY: Clear to auscultation bilaterally; No rales, rhonchi, wheezing, or rubs  CARDIOVASCULAR: Regular rate and rhythm; No murmurs; no peripheral edema  GI: Soft, nontender, non distended, normal bowel sounds      POCT Blood Glucose.: 137 mg/dL (04-15-19 @ 16:56)  POCT Blood Glucose.: 197 mg/dL (04-15-19 @ 12:03)  POCT Blood Glucose.: 187 mg/dL (04-15-19 @ 08:10)  POCT Blood Glucose.: 143 mg/dL (04-14-19 @ 21:54)  POCT Blood Glucose.: 214 mg/dL (04-14-19 @ 16:59)  POCT Blood Glucose.: 248 mg/dL (04-14-19 @ 11:55)  POCT Blood Glucose.: 163 mg/dL (04-14-19 @ 08:20)  POCT Blood Glucose.: 209 mg/dL (04-14-19 @ 00:31)  POCT Blood Glucose.: 210 mg/dL (04-13-19 @ 22:37)  POCT Blood Glucose.: 369 mg/dL (04-13-19 @ 18:38)  POCT Blood Glucose.: 530 mg/dL (04-13-19 @ 16:03)  POCT Blood Glucose.: 492 mg/dL (04-13-19 @ 16:02)    04-15    141  |  102  |  10  ----------------------------<  153<H>  4.1   |  24  |  0.62    EGFR if : 126  EGFR if non : 109    Ca    9.6      04-15  Mg     1.7     04-15  Phos  3.7     04-15    TPro  7.9  /  Alb  4.4  /  TBili  0.4  /  DBili  x   /  AST  17  /  ALT  25  /  AlkPhos  143<H>  04-13          Thyroid Function Tests:  04-13 @ 22:29 TSH 2.50      Hemoglobin A1C, Whole Blood: 12.4 % <H> [4.0 - 5.6] (04-13-19 @ 22:40)

## 2019-04-15 NOTE — DISCHARGE NOTE NURSING/CASE MANAGEMENT/SOCIAL WORK - NSDCDPATPORTLINK_GEN_ALL_CORE
You can access the [facility] Portal, offered by [facility], by registering with the following website: [email or web address]

## 2019-04-15 NOTE — DISCHARGE NOTE NURSING/CASE MANAGEMENT/SOCIAL WORK - NSDCPEWEB_GEN_ALL_CORE
[place] website --- [email or web address] Tobacco [email or web address] website --- [email or web address]

## 2019-04-15 NOTE — CHART NOTE - NSCHARTNOTEFT_GEN_A_CORE
Nutrition note     Nutrition consult received for education reinforcement due to HbA1c 12.4% (04/13). Dietitian Initial Evaluation completed yesterday (04/14).     Thoroughly reviewed education on T2DM and heart healthy nutrition therapy. Provided education on foods containing carbohydrates, foods containing proteins, and portion sizes. Stressed the importance of a balanced meal to maintain blood glucose and help prevent further weight loss by adequate intake in portion size of protein, carbohydrates, and lipids and consuming 3 meals/day. Encouraged vegetables consumption. Described HbA1c and stressed importance of its normal levels. Encouraged Pt to monitor blood glucose at home at different moments of the day everyday. Recommended water consumption with avoidance of soda and juice. Recommended limited salt intake. Reviewed foods high in salt and amount of salt recommended per day. Stressed the importance of limited fried foods and saturated fat consumption. Noted pt received handouts with information as per previous RD note. Pt amenable for education, however noted pt frustrated/not ready for lifestyle changes due to new diagnosis of DM (confirmed with RN). Pt made aware RD remains available.     Dietitian remains available.   Edel Lam MS, RDN, #[phone] Nutrition note     Nutrition consult received for education reinforcement due to HbA1c 12.4% (04/13) before discharge. Dietitian Initial Evaluation completed yesterday (04/14).     Thoroughly reviewed education on T2DM and heart healthy nutrition therapy. Provided education on foods containing carbohydrates, foods containing proteins, and portion sizes. Stressed the importance of a balanced meal to maintain blood glucose and help prevent further weight loss by adequate intake in portion size of protein, carbohydrates, and lipids and consuming 3 meals/day. Encouraged vegetables consumption. Described HbA1c and stressed importance of its normal levels. Encouraged Pt to monitor blood glucose at home at different moments of the day everyday. Recommended water consumption with avoidance of soda and juice. Recommended limited salt intake. Reviewed foods high in salt and amount of salt recommended per day. Stressed the importance of limited fried foods and saturated fat consumption. Noted pt received handouts with information as per previous RD note. Pt amenable for education, however noted pt frustrated/not ready for lifestyle changes due to new diagnosis of DM (confirmed with RN). Pt made aware RD remains available.     Dietitian remains available.   Edel Lam MS, RDN, #[phone] Nutrition note     Nutrition consult received for education reinforcement before discharge due to HbA1c 12.4% (04/13). Dietitian Initial Evaluation completed yesterday (04/14).     Thoroughly reviewed education on T2DM and heart healthy nutrition therapy. Provided education on foods containing carbohydrates, foods containing proteins, and portion sizes. Stressed the importance of a balanced meal to maintain blood glucose and help prevent further weight loss by adequate intake in portion size of protein, carbohydrates, and lipids and consuming 3 meals/day. Encouraged vegetables consumption. Described HbA1c and stressed importance of its normal levels. Encouraged Pt to monitor blood glucose at home at different moments of the day everyday. Recommended water consumption with avoidance of soda and juice. Recommended limited salt intake. Reviewed foods high in salt and amount of salt recommended per day. Stressed the importance of limited fried foods and saturated fat consumption. Noted pt received handouts with information as per previous RD note. Pt amenable for education, however noted pt frustrated/not ready for lifestyle changes due to new diagnosis of DM (confirmed with RN). Pt made aware RD remains available.     Dietitian remains available.   Edel Lam MS, RDN, #[phone]

## 2019-04-15 NOTE — PROGRESS NOTE ADULT - SUBJECTIVE AND OBJECTIVE BOX
Ras Lester MD MEE  Internal Medicine PGY-1  Pager [facility]: [phone]; [facility] [id number]    Patient is a 58y old  Male who presents with a chief complaint of Hyperglycemia (2019 11:37)    SUBJECTIVE/OVERNIGHT EVENTS   No acute events overnight. Patient tolerating meals, without n/v. Reports having daily BM. Denies fevers, chills, SOB. ROS otherwise negative.     OBJECTIVE  Vital Signs Last 24 Hrs  T(C): 36.4 (15 Apr 2019 05:23), Max: 36.8 (2019 11:14)  T(F): 97.6 (15 Apr 2019 05:23), Max: 98.3 (2019 22:07)  HR: 78 (15 Apr 2019 05:23) (64 - 83)  BP: 108/69 (15 Apr 2019 05:23) (108/69 - 120/83)  BP(mean): --  RR: 18 (15 Apr 2019 05:23) (18 - 18)  SpO2: 97% (15 Apr 2019 05:23) (95% - 97%)    I&O's Summary    2019 07:01  -  15 Apr 2019 07:00  --------------------------------------------------------  IN: 1280 mL / OUT: 100 mL / NET: 1180 mL    PHYSICAL EXAM:  GENERAL: NAD, well-developed  HEAD:  Atraumatic, Normocephalic  EYES: EOMI, conjunctiva and sclera clear  NECK: Supple, No JVD  CHEST/LUNG: Clear to auscultation bilaterally; No wheeze  HEART: Regular rate and rhythm; No murmurs, rubs, or gallops  ABDOMEN: Soft, Nontender, Nondistended; Bowel sounds present  EXTREMITIES:  2+ Peripheral Pulses, No clubbing, cyanosis, or edema  PSYCH: AAOx3  NEUROLOGY: non-focal, SILT b/l LE/UE, 5/5 muscle strength UE/LE b/l   SKIN: No rashes or lesions    LABS                        14.7   6.9   )-----------( 179      ( 15 Apr 2019 06:50 )             44.7                         13.2   9.36  )-----------( 175      ( 2019 10:19 )             38.8     04-15    141  |  102  |  10  ----------------------------<  153<H>  4.1   |  24  |  0.62  -14    140  |  103  |  13  ----------------------------<  254<H>  4.2   |  22  |  0.62    Ca    9.6      15 Apr 2019 06:47  Ca    9.3      2019 12:26  Phos  3.7     -15  Mg     1.7     04-15    TPro  7.9  /  Alb  4.4  /  TBili  0.4  /  DBili  x   /  AST  17  /  ALT  25  /  AlkPhos  143<H>      CAPILLARY BLOOD GLUCOSE  POCT Blood Glucose.: 143 mg/dL (2019 21:54)  POCT Blood Glucose.: 214 mg/dL (2019 16:59)  POCT Blood Glucose.: 248 mg/dL (2019 11:55)   2019 15:43 Troponin x     / CK 39 U/L / CKMB x     / CKMB Units x        Urinalysis Basic - ( 2019 16:20 )    Color: Yellow / Appearance: Clear / S.039 / pH: x  Gluc: x / Ketone: Moderate  / Bili: Negative / Urobili: Negative   Blood: x / Protein: Negative / Nitrite: Negative   Leuk Esterase: Negative / RBC: 1 /hpf / WBC 1 /HPF   Sq Epi: x / Non Sq Epi: 0 /hpf / Bacteria: Negative    MEDICATIONS  (STANDING):  aspirin enteric coated 81 milliGRAM(s) Oral daily  atorvastatin 40 milliGRAM(s) Oral at bedtime  dextrose 5%. 1000 milliLiter(s) (50 mL/Hr) IV Continuous <Continuous>  dextrose 50% Injectable 12.5 Gram(s) IV Push once  dextrose 50% Injectable 25 Gram(s) IV Push once  dextrose 50% Injectable 25 Gram(s) IV Push once  enoxaparin Injectable 40 milliGRAM(s) SubCutaneous every 24 hours  gabapentin 300 milliGRAM(s) Oral three times a day  influenza   Vaccine 0.5 milliLiter(s) IntraMuscular once  insulin glargine Injectable (LANTUS) 27 Unit(s) SubCutaneous <User Schedule>  insulin lispro (HumaLOG) corrective regimen sliding scale   SubCutaneous three times a day before meals  insulin lispro (HumaLOG) corrective regimen sliding scale   SubCutaneous at bedtime  insulin lispro Injectable (HumaLOG) 9 Unit(s) SubCutaneous three times a day before meals  metoprolol tartrate 25 milliGRAM(s) Oral two times a day  multivitamin 1 Tablet(s) Oral daily  nicotine -   7 mG/24Hr(s) Patch 1 patch Transdermal daily  polyethylene glycol 3350 17 Gram(s) Oral daily  senna 2 Tablet(s) Oral at bedtime    MEDICATIONS  (PRN):  celecoxib 200 milliGRAM(s) Oral two times a day with meals PRN muscle spasm and pain  cyclobenzaprine 10 milliGRAM(s) Oral three times a day PRN Muscle Spasm  dextrose 40% Gel 15 Gram(s) Oral once PRN Blood Glucose LESS THAN 70 milliGRAM(s)/deciliter  glucagon  Injectable 1 milliGRAM(s) IntraMuscular once PRN Glucose LESS THAN 70 milligrams/deciliter  ibuprofen  Tablet. 400 milliGRAM(s) Oral every 6 hours PRN Mild Pain (1 - 3), Moderate Pain (4 - 6), Severe Pain (7 - 10)  melatonin 3 milliGRAM(s) Oral at bedtime PRN Insomnia

## 2019-04-15 NOTE — PROGRESS NOTE ADULT - ATTENDING COMMENTS
Seen, examined the patient  - Resting in bed, no specific complaints    reviewed labs, imaging   - Appreciated Endo recommendation- Patient will get Lantus and Humalog pens ( HbA1C 12.4%)    Diabetic education and teaching have been provided   - d/c planning tomorrow

## 2019-04-15 NOTE — DISCHARGE NOTE PROVIDER - NSDCCPCAREPLAN_GEN_ALL_CORE_FT
PRINCIPAL DISCHARGE DIAGNOSIS  Diagnosis: Uncontrolled type 2 diabetes mellitus with hyperglycemia  Assessment and Plan of Treatment: You came in with very high blood glucose and you were found to be in a mild diabetic ketoacidosis state due to your recent diet change. You were started on insulin to control your glucose level. Please check your sugar 4 X /day (3 times before meals and once before bedtime), and keep log of your blood glucose level. Please use insulin as prescribed. Please make a follow up appointment with endocrinologist in 1-2 weeks after discharge. Please consume low carbohydrates. low fat, and low sugar diet. Please come back to emergency room if you develop extreme thirst, increasing urination, fatigue, and if your sugar level is > 400
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
## 2019-04-15 NOTE — DISCHARGE NOTE PROVIDER - CARE PROVIDER_API CALL
Zeinab Mcgregor)  EndocrinologyMetabDiabetes; Internal Medicine  [address] [facility] [address]  [place] [address]  Phone: [phone]  Fax: [phone]  Follow Up Time:

## 2019-04-15 NOTE — DISCHARGE NOTE PROVIDER - NSDCFUADDAPPT_GEN_ALL_CORE_FT
The following appointment has been made for you, please arrive 15 minutes prior to your appointments  4/26/19 Friday at 11am with RN/KODY Perez   7/10/19 Wednesday at 330PM with Dr Shelly Weaver.   Both appointments at [address] [place] [address]   Telephone: [phone].

## 2019-04-16 VITALS
OXYGEN SATURATION: 97 % | HEART RATE: 84 BPM | SYSTOLIC BLOOD PRESSURE: 107 MMHG | DIASTOLIC BLOOD PRESSURE: 74 MMHG | RESPIRATION RATE: 17 BRPM | TEMPERATURE: 98 F

## 2019-04-16 LAB
GLUCOSE BLDC GLUCOMTR-MCNC: 204 MG/DL — HIGH (ref 70–99)
GLUCOSE BLDC GLUCOMTR-MCNC: 209 MG/DL — HIGH (ref 70–99)

## 2019-04-16 PROCEDURE — 86341 ISLET CELL ANTIBODY: CPT

## 2019-04-16 PROCEDURE — 81001 URINALYSIS AUTO W/SCOPE: CPT

## 2019-04-16 PROCEDURE — 82803 BLOOD GASES ANY COMBINATION: CPT

## 2019-04-16 PROCEDURE — 71046 X-RAY EXAM CHEST 2 VIEWS: CPT

## 2019-04-16 PROCEDURE — 82435 ASSAY OF BLOOD CHLORIDE: CPT

## 2019-04-16 PROCEDURE — 86803 HEPATITIS C AB TEST: CPT

## 2019-04-16 PROCEDURE — 84100 ASSAY OF PHOSPHORUS: CPT

## 2019-04-16 PROCEDURE — 80061 LIPID PANEL: CPT

## 2019-04-16 PROCEDURE — 82010 KETONE BODYS QUAN: CPT

## 2019-04-16 PROCEDURE — 97161 PT EVAL LOW COMPLEX 20 MIN: CPT

## 2019-04-16 PROCEDURE — 85027 COMPLETE CBC AUTOMATED: CPT

## 2019-04-16 PROCEDURE — 80048 BASIC METABOLIC PNL TOTAL CA: CPT

## 2019-04-16 PROCEDURE — 82962 GLUCOSE BLOOD TEST: CPT

## 2019-04-16 PROCEDURE — 82306 VITAMIN D 25 HYDROXY: CPT

## 2019-04-16 PROCEDURE — 80053 COMPREHEN METABOLIC PANEL: CPT

## 2019-04-16 PROCEDURE — 84443 ASSAY THYROID STIM HORMONE: CPT

## 2019-04-16 PROCEDURE — 93005 ELECTROCARDIOGRAM TRACING: CPT

## 2019-04-16 PROCEDURE — 85014 HEMATOCRIT: CPT

## 2019-04-16 PROCEDURE — 99232 SBSQ HOSP IP/OBS MODERATE 35: CPT

## 2019-04-16 PROCEDURE — 84295 ASSAY OF SERUM SODIUM: CPT

## 2019-04-16 PROCEDURE — 82550 ASSAY OF CK (CPK): CPT

## 2019-04-16 PROCEDURE — 96372 THER/PROPH/DIAG INJ SC/IM: CPT

## 2019-04-16 PROCEDURE — 82330 ASSAY OF CALCIUM: CPT

## 2019-04-16 PROCEDURE — 82977 ASSAY OF GGT: CPT

## 2019-04-16 PROCEDURE — 99285 EMERGENCY DEPT VISIT HI MDM: CPT | Mod: 25

## 2019-04-16 PROCEDURE — 83735 ASSAY OF MAGNESIUM: CPT

## 2019-04-16 PROCEDURE — 86337 INSULIN ANTIBODIES: CPT

## 2019-04-16 PROCEDURE — 12345: CPT | Mod: NC

## 2019-04-16 PROCEDURE — 82652 VIT D 1 25-DIHYDROXY: CPT

## 2019-04-16 PROCEDURE — 82947 ASSAY GLUCOSE BLOOD QUANT: CPT

## 2019-04-16 PROCEDURE — 83036 HEMOGLOBIN GLYCOSYLATED A1C: CPT

## 2019-04-16 PROCEDURE — 83605 ASSAY OF LACTIC ACID: CPT

## 2019-04-16 PROCEDURE — 84132 ASSAY OF SERUM POTASSIUM: CPT

## 2019-04-16 RX ORDER — METOPROLOL TARTRATE 50 MG
1 TABLET ORAL
Qty: 0 | Refills: 0 | COMMUNITY

## 2019-04-16 RX ORDER — ASCORBIC ACID 60 MG
1 TABLET,CHEWABLE ORAL
Qty: 0 | Refills: 0 | COMMUNITY

## 2019-04-16 RX ORDER — MELOXICAM 15 MG/1
1 TABLET ORAL
Qty: 0 | Refills: 0 | COMMUNITY

## 2019-04-16 RX ORDER — ROSUVASTATIN CALCIUM 5 MG/1
1 TABLET ORAL
Qty: 0 | Refills: 0 | COMMUNITY

## 2019-04-16 RX ORDER — CYCLOBENZAPRINE HYDROCHLORIDE 10 MG/1
1 TABLET, FILM COATED ORAL
Qty: 0 | Refills: 0 | COMMUNITY

## 2019-04-16 RX ORDER — ASPIRIN/CALCIUM CARB/MAGNESIUM 324 MG
1 TABLET ORAL
Qty: 0 | Refills: 0 | COMMUNITY

## 2019-04-16 RX ORDER — INSULIN NPH HUM/REG INSULIN HM 70-30/ML
30 VIAL (ML) SUBCUTANEOUS
Qty: 30 | Refills: 0 | OUTPATIENT
Start: 2019-04-16 | End: 2019-05-15

## 2019-04-16 RX ORDER — GABAPENTIN 400 MG/1
1 CAPSULE ORAL
Qty: 0 | Refills: 0 | COMMUNITY

## 2019-04-16 RX ORDER — INSULIN GLARGINE 100 [IU]/ML
25 INJECTION, SOLUTION SUBCUTANEOUS
Qty: 30 | Refills: 0 | OUTPATIENT
Start: 2019-04-16 | End: 2019-05-15

## 2019-04-16 RX ORDER — INSULIN ASPART 100 [IU]/ML
8 INJECTION, SOLUTION SUBCUTANEOUS
Qty: 33 | Refills: 0 | OUTPATIENT
Start: 2019-04-16 | End: 2019-05-15

## 2019-04-16 RX ADMIN — Medication 1 TABLET(S): at 12:16

## 2019-04-16 RX ADMIN — Medication 8 UNIT(S): at 08:44

## 2019-04-16 RX ADMIN — Medication 4: at 12:16

## 2019-04-16 RX ADMIN — GABAPENTIN 300 MILLIGRAM(S): 400 CAPSULE ORAL at 05:27

## 2019-04-16 RX ADMIN — Medication 4: at 08:43

## 2019-04-16 RX ADMIN — Medication 8 UNIT(S): at 12:16

## 2019-04-16 RX ADMIN — CYCLOBENZAPRINE HYDROCHLORIDE 10 MILLIGRAM(S): 10 TABLET, FILM COATED ORAL at 05:26

## 2019-04-16 RX ADMIN — CELECOXIB 200 MILLIGRAM(S): 200 CAPSULE ORAL at 06:59

## 2019-04-16 RX ADMIN — Medication 1 PATCH: at 12:15

## 2019-04-16 RX ADMIN — CELECOXIB 200 MILLIGRAM(S): 200 CAPSULE ORAL at 06:29

## 2019-04-16 RX ADMIN — Medication 25 MILLIGRAM(S): at 05:27

## 2019-04-16 RX ADMIN — ENOXAPARIN SODIUM 40 MILLIGRAM(S): 100 INJECTION SUBCUTANEOUS at 08:44

## 2019-04-16 RX ADMIN — Medication 1 PATCH: at 07:02

## 2019-04-16 RX ADMIN — Medication 1 PATCH: at 12:16

## 2019-04-16 RX ADMIN — Medication 81 MILLIGRAM(S): at 12:16

## 2019-04-16 RX ADMIN — Medication 1 PATCH: at 01:34

## 2019-04-16 NOTE — PHYSICAL THERAPY INITIAL EVALUATION ADULT - DISCHARGE DISPOSITION, PT EVAL
home w/ outpatient services/as prior, following recent spine surgery. No hospital based skilled PT needs

## 2019-04-16 NOTE — PROGRESS NOTE ADULT - PROBLEM SELECTOR PLAN 4
- Patient on metoprolol suc 100mg daily, will do metoprolol 25mg BID for now given soft BP, uptitrate as tolerated   - monitor BP routinely

## 2019-04-16 NOTE — PROGRESS NOTE ADULT - PROBLEM SELECTOR PLAN 2
A1c 12.4 - this would be new diagnosis of DM for pt; likely type 2 vs latent diabetes in adult. However will need to rule out T1DM    - f/u islet cell antibodies, THIERNO antibodies, Zinc transporter 8 Ab, Human insulin Ab   - will need to be discharged on insulin, will require diabetic and insulin teaching, will need f/u as oupt with PMD vs endocrine  no protein in urine, no need to start ACEi/ARB for now
A1c 12.4 - this would be new diagnosis of DM for pt; likely type 2 vs latent diabetes in adult. However will need to rule out T1DM    - f/u islet cell and THIERNO antibodies   insulin and FS monitoring as above for now    will need to be discharged on insulin, will require diabetic and insulin teaching, will need f/u as oupt with PMD vs endocrine  no protein in urine, no need to start ACEi/ARB for now
A1c 12.4 - this would be new diagnosis of DM for pt; likely type 2 vs latent diabetes in adult. However will need to rule out T1DM    - f/u islet cell antibodies, THIERNO antibodies, Zinc transporter 8 Ab, Human insulin Ab   - will need to be discharged on insulin, will require diabetic and insulin teaching, will need f/u as oupt with PMD vs endocrine  no protein in urine, no need to start ACEi/ARB for now

## 2019-04-16 NOTE — PROGRESS NOTE ADULT - PROBLEM SELECTOR PLAN 9
- Lovenox for DVT ppx   - Diet: DASH/ consistent carb

## 2019-04-16 NOTE — PROGRESS NOTE ADULT - PROBLEM SELECTOR PROBLEM 1
DKA, type 2, not at goal
Uncontrolled type 2 diabetes mellitus with hyperglycemia
DKA (diabetic ketoacidoses)

## 2019-04-16 NOTE — ADVANCED PRACTICE NURSE CONSULT - RECOMMEDATIONS
Pt educated extensively regarding diabetes self-management behaviors and exercise and diabetes with while taking insulin explained to pt.  Pt educated on importance of checking his BG FS before meals and bedtime, before/after exercise, with any s/s hypo-hyperglycemia, and treating high BS (250 or >) before exercising, that resistance exercise can assist in minimizing risk of exercise-induced hypoglycemia but pt should check BG FS and consuming 15-3- grams of carbs with protein if his FS is less than 100, and the importance of checking his urine for ketones is his FS are 250 or greater before exercise and if positive he is positive for ketones, pt should treat high sugars and exercise should be avoided until ketones resolved.       Pt extremely irritable during RN, CDE visit stating he was complaining to his PCP and other physicians regarding his weight loss and not feeling well with decreased appetite and that labs and exams were conducted but he was only told that his HDL and liver enzymes were high.  Pt able to verbalize understanding of all RN, CDE instructions but states, "this is too hard and now my whole life is going to change".  Pt provided with extensive guidance and support about his new diagnosis and pt states he will be more amenable to resuming exercise once personal issues (wife being sick and father-in-law recently having heart attack) are resolved.  Pt pending discharge for today and endocrine team to follow.

## 2019-04-16 NOTE — PROGRESS NOTE ADULT - ATTENDING COMMENTS
Seen, examined the patient  - Sitting in chair, no specific complaints    reviewed labs, imaging   - Appreciated Endo recommendation- Patient will get- Novolin 70/30 Pens 30 units w/breakfast, 20 units w/dinner( HbA1C 12.4%)    Diabetic education and teaching have been provided   - d/c today home, he will see Endocrinologist in a week outpatient     advised to be compliant on diabetic diet   ** discharge time- 43 min

## 2019-04-16 NOTE — PROGRESS NOTE ADULT - PROBLEM SELECTOR PLAN 3
- Chronic back pain below surgical site in the lumbar area.   - c/w gabapentin to 300mg TID  - c/w cyclobenzaprine 10mg TID  - c/w celecoxib 200mg BID   - Ibuprofen 400mg q6h prn   - PT eval   - would benefit from pain management f/u on discharge    - Lidocaine patch  - PT ordered.

## 2019-04-16 NOTE — PROGRESS NOTE ADULT - PROBLEM SELECTOR PLAN 6
-Likely from uncontrolled diabetes  -Patient had colonoscopy in 2018, which showed 3 small non-cancerious polyps.   -Constipation likely from pain meds and poor appetite.

## 2019-04-16 NOTE — PHYSICAL THERAPY INITIAL EVALUATION ADULT - GENERAL OBSERVATIONS, REHAB EVAL
Received pt ambulating in room while speaking on the phone. Vitals not taken 2/2 above. Dressed, stating waiting for d/c.
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
## 2019-04-16 NOTE — PHYSICAL THERAPY INITIAL EVALUATION ADULT - ADDITIONAL COMMENTS
Pt has stairs at home. Is independent ambulator. Uses cane that was given to him following recent back surgery mostly when outside "just in case." Was going to PT as an outpatient until recently when started not feeling well.

## 2019-04-16 NOTE — PROGRESS NOTE ADULT - SUBJECTIVE AND OBJECTIVE BOX
Ras Lester MD MEE  Internal Medicine PGY-1  Pager [facility]: [phone]; [facility] [id number]    Patient is a 58y old  Male who presents with a chief complaint of Hyperglycemia (15 Apr 2019 19:14)      SUBJECTIVE/OVERNIGHT EVENTS   No acute events overnight. Patient tolerating meals, without n/v. Reports having daily BM. Denies fevers, chills, SOB. ROS otherwise negative.     OBJECTIVE  Vital Signs Last 24 Hrs  T(C): 36.5 (16 Apr 2019 04:50), Max: 36.9 (15 Apr 2019 12:14)  T(F): 97.7 (16 Apr 2019 04:50), Max: 98.5 (15 Apr 2019 12:14)  HR: 63 (16 Apr 2019 04:50) (63 - 83)  BP: 101/- (16 Apr 2019 04:50) (101/- - 110/74)  BP(mean): 60 (16 Apr 2019 04:50) (60 - 60)  RR: 17 (16 Apr 2019 04:50) (17 - 17)  SpO2: 97% (16 Apr 2019 04:50) (95% - 98%)    I&O's Summary    15 Apr 2019 07:01  -  16 Apr 2019 07:00  --------------------------------------------------------  IN: 1340 mL / OUT: 2 mL / NET: 1338 mL        PHYSICAL EXAM:  GENERAL: NAD, well-developed  HEAD:  Atraumatic, Normocephalic  EYES: EOMI, conjunctiva and sclera clear  NECK: Supple, No JVD  CHEST/LUNG: Clear to auscultation bilaterally; No wheeze  HEART: Regular rate and rhythm; No murmurs, rubs, or gallops  ABDOMEN: Soft, Nontender, Nondistended; Bowel sounds present  EXTREMITIES:  2+ Peripheral Pulses, No clubbing, cyanosis, or edema  PSYCH: AAOx3  NEUROLOGY: non-focal  SKIN: No rashes or lesions    LABS                        14.7   6.9   )-----------( 179      ( 15 Apr 2019 06:50 )             44.7                         13.2   9.36  )-----------( 175      ( 14 Apr 2019 10:19 )             38.8     04-15    141  |  102  |  10  ----------------------------<  153<H>  4.1   |  24  |  0.62  04-14    140  |  103  |  13  ----------------------------<  254<H>  4.2   |  22  |  0.62    Ca    9.6      15 Apr 2019 06:47  Ca    9.3      14 Apr 2019 12:26  Phos  3.7     04-15  Mg     1.7     04-15      CAPILLARY BLOOD GLUCOSE      POCT Blood Glucose.: 180 mg/dL (15 Apr 2019 21:47)  POCT Blood Glucose.: 137 mg/dL (15 Apr 2019 16:56)  POCT Blood Glucose.: 197 mg/dL (15 Apr 2019 12:03)  POCT Blood Glucose.: 187 mg/dL (15 Apr 2019 08:10)       13 Apr 2019 15:43 Troponin x     / CK 39 U/L / CKMB x     / CKMB Units x                      RADIOLOGY & ADDITIONAL TESTS:    MEDICATIONS  (STANDING):  aspirin enteric coated 81 milliGRAM(s) Oral daily  atorvastatin 40 milliGRAM(s) Oral at bedtime  dextrose 5%. 1000 milliLiter(s) (50 mL/Hr) IV Continuous <Continuous>  dextrose 50% Injectable 12.5 Gram(s) IV Push once  dextrose 50% Injectable 25 Gram(s) IV Push once  dextrose 50% Injectable 25 Gram(s) IV Push once  enoxaparin Injectable 40 milliGRAM(s) SubCutaneous every 24 hours  gabapentin 300 milliGRAM(s) Oral three times a day  influenza   Vaccine 0.5 milliLiter(s) IntraMuscular once  insulin glargine Injectable (LANTUS) 25 Unit(s) SubCutaneous <User Schedule>  insulin lispro (HumaLOG) corrective regimen sliding scale   SubCutaneous three times a day before meals  insulin lispro (HumaLOG) corrective regimen sliding scale   SubCutaneous at bedtime  insulin lispro Injectable (HumaLOG) 8 Unit(s) SubCutaneous three times a day before meals  metoprolol tartrate 25 milliGRAM(s) Oral two times a day  multivitamin 1 Tablet(s) Oral daily  nicotine -   7 mG/24Hr(s) Patch 1 patch Transdermal daily  polyethylene glycol 3350 17 Gram(s) Oral daily  senna 2 Tablet(s) Oral at bedtime    MEDICATIONS  (PRN):  celecoxib 200 milliGRAM(s) Oral two times a day with meals PRN muscle spasm and pain  cyclobenzaprine 10 milliGRAM(s) Oral three times a day PRN Muscle Spasm  dextrose 40% Gel 15 Gram(s) Oral once PRN Blood Glucose LESS THAN 70 milliGRAM(s)/deciliter  glucagon  Injectable 1 milliGRAM(s) IntraMuscular once PRN Glucose LESS THAN 70 milligrams/deciliter  ibuprofen  Tablet. 400 milliGRAM(s) Oral every 6 hours PRN Mild Pain (1 - 3), Moderate Pain (4 - 6), Severe Pain (7 - 10)  melatonin 3 milliGRAM(s) Oral at bedtime PRN Insomnia Ras Lester MD MEE  Internal Medicine PGY-1  Pager [facility]: [phone]; [facility] [id number]    Patient is a 58y old  Male who presents with a chief complaint of Hyperglycemia (15 Apr 2019 19:14)    SUBJECTIVE/OVERNIGHT EVENTS   No acute events overnight. Blood sugars more controlled. Patient tolerating meals, without n/v. Reports having daily BM. Denies fevers, chills, SOB. ROS otherwise negative.     OBJECTIVE  Vital Signs Last 24 Hrs  T(C): 36.5 (16 Apr 2019 04:50), Max: 36.9 (15 Apr 2019 12:14)  T(F): 97.7 (16 Apr 2019 04:50), Max: 98.5 (15 Apr 2019 12:14)  HR: 63 (16 Apr 2019 04:50) (63 - 83)  BP: 101/- (16 Apr 2019 04:50) (101/- - 110/74)  BP(mean): 60 (16 Apr 2019 04:50) (60 - 60)  RR: 17 (16 Apr 2019 04:50) (17 - 17)  SpO2: 97% (16 Apr 2019 04:50) (95% - 98%)    I&O's Summary    15 Apr 2019 07:01  -  16 Apr 2019 07:00  --------------------------------------------------------  IN: 1340 mL / OUT: 2 mL / NET: 1338 mL    PHYSICAL EXAM:  GENERAL: NAD, well-developed  HEAD:  Atraumatic, Normocephalic  EYES: EOMI, conjunctiva and sclera clear  NECK: Supple, No JVD  CHEST/LUNG: Clear to auscultation bilaterally; No wheeze  HEART: Regular rate and rhythm; No murmurs, rubs, or gallops  ABDOMEN: Soft, Nontender, Nondistended; Bowel sounds present  EXTREMITIES:  2+ Peripheral Pulses, No clubbing, cyanosis, or edema  PSYCH: AAOx3  NEUROLOGY: SILT b/l LE, patient non-compliant with remainder of neuro exam   SKIN: No rashes or lesions    LABS                      14.7   6.9   )-----------( 179      ( 15 Apr 2019 06:50 )             44.7                         13.2   9.36  )-----------( 175      ( 14 Apr 2019 10:19 )             38.8     04-15    141  |  102  |  10  ----------------------------<  153<H>  4.1   |  24  |  0.62  04-14    140  |  103  |  13  ----------------------------<  254<H>  4.2   |  22  |  0.62    Ca    9.6      15 Apr 2019 06:47  Ca    9.3      14 Apr 2019 12:26  Phos  3.7     04-15  Mg     1.7     04-15    CAPILLARY BLOOD GLUCOSE  POCT Blood Glucose.: 180 mg/dL (15 Apr 2019 21:47)  POCT Blood Glucose.: 137 mg/dL (15 Apr 2019 16:56)  POCT Blood Glucose.: 197 mg/dL (15 Apr 2019 12:03)  POCT Blood Glucose.: 187 mg/dL (15 Apr 2019 08:10)     13 Apr 2019 15:43 Troponin x     / CK 39 U/L / CKMB x     / CKMB Units x        MEDICATIONS  (STANDING):  aspirin enteric coated 81 milliGRAM(s) Oral daily  atorvastatin 40 milliGRAM(s) Oral at bedtime  dextrose 5%. 1000 milliLiter(s) (50 mL/Hr) IV Continuous <Continuous>  dextrose 50% Injectable 12.5 Gram(s) IV Push once  dextrose 50% Injectable 25 Gram(s) IV Push once  dextrose 50% Injectable 25 Gram(s) IV Push once  enoxaparin Injectable 40 milliGRAM(s) SubCutaneous every 24 hours  gabapentin 300 milliGRAM(s) Oral three times a day  influenza   Vaccine 0.5 milliLiter(s) IntraMuscular once  insulin glargine Injectable (LANTUS) 25 Unit(s) SubCutaneous <User Schedule>  insulin lispro (HumaLOG) corrective regimen sliding scale   SubCutaneous three times a day before meals  insulin lispro (HumaLOG) corrective regimen sliding scale   SubCutaneous at bedtime  insulin lispro Injectable (HumaLOG) 8 Unit(s) SubCutaneous three times a day before meals  metoprolol tartrate 25 milliGRAM(s) Oral two times a day  multivitamin 1 Tablet(s) Oral daily  nicotine -   7 mG/24Hr(s) Patch 1 patch Transdermal daily  polyethylene glycol 3350 17 Gram(s) Oral daily  senna 2 Tablet(s) Oral at bedtime    MEDICATIONS  (PRN):  celecoxib 200 milliGRAM(s) Oral two times a day with meals PRN muscle spasm and pain  cyclobenzaprine 10 milliGRAM(s) Oral three times a day PRN Muscle Spasm  dextrose 40% Gel 15 Gram(s) Oral once PRN Blood Glucose LESS THAN 70 milliGRAM(s)/deciliter  glucagon  Injectable 1 milliGRAM(s) IntraMuscular once PRN Glucose LESS THAN 70 milligrams/deciliter  ibuprofen  Tablet. 400 milliGRAM(s) Oral every 6 hours PRN Mild Pain (1 - 3), Moderate Pain (4 - 6), Severe Pain (7 - 10)  melatonin 3 milliGRAM(s) Oral at bedtime PRN Insomnia

## 2019-04-16 NOTE — PROGRESS NOTE ADULT - SUBJECTIVE AND OBJECTIVE BOX
Diabetes Follow up note:  Interval Hx:  58 year old male w/no hx of DM, chronic back pain s/p laminectomy here w/weight loss and found to have mild DKA and newly diagnosed DM. Pt w/improvement of symptoms while on basal/bolus here in hospital. Pt seen at bedside. Frustrated over diagnosis and feels his life will be drastically different now. Pt has self-injected and learned insulin pen while here. Reports working out daily at 4:30am, takes testosterone supplements at home. Pt also frustrated that nobody is being honest with him if he has Type 1 DM or Type 2.     Review of Systems:  General: as above.   GI: Tolerating POs without any N/V/D/ABD PAIN.  CV: No CP/SOB  ENDO: No S&Sx of hypoglycemia  MEDS:  atorvastatin 40 milliGRAM(s) Oral at bedtime    insulin glargine Injectable (LANTUS) 25 Unit(s) SubCutaneous <User Schedule>  insulin lispro (HumaLOG) corrective regimen sliding scale   SubCutaneous three times a day before meals  insulin lispro (HumaLOG) corrective regimen sliding scale   SubCutaneous at bedtime  insulin lispro Injectable (HumaLOG) 8 Unit(s) SubCutaneous three times a day before meals      Allergies    No Known Allergies        PE:   General: Male lying in bed. NAD>   Vital Signs Last 24 Hrs  T(C): 36.5 (16 Apr 2019 04:50), Max: 36.9 (15 Apr 2019 12:14)  T(F): 97.7 (16 Apr 2019 04:50), Max: 98.5 (15 Apr 2019 12:14)  HR: 63 (16 Apr 2019 04:50) (63 - 83)  BP: 101/- (16 Apr 2019 04:50) (101/- - 110/74)  BP(mean): 60 (16 Apr 2019 04:50) (60 - 60)  RR: 17 (16 Apr 2019 04:50) (17 - 17)  SpO2: 97% (16 Apr 2019 04:50) (95% - 98%)  Abd: Soft, NT,ND,   Extremities: Warm. no edema noted.   Neuro: A&O X3.     LABS:    POCT Blood Glucose.: 209 mg/dL (04-16-19 @ 11:58)  POCT Blood Glucose.: 204 mg/dL (04-16-19 @ 08:40)  POCT Blood Glucose.: 180 mg/dL (04-15-19 @ 21:47)  POCT Blood Glucose.: 137 mg/dL (04-15-19 @ 16:56)  POCT Blood Glucose.: 197 mg/dL (04-15-19 @ 12:03)  POCT Blood Glucose.: 187 mg/dL (04-15-19 @ 08:10)  POCT Blood Glucose.: 143 mg/dL (04-14-19 @ 21:54)  POCT Blood Glucose.: 214 mg/dL (04-14-19 @ 16:59)  POCT Blood Glucose.: 248 mg/dL (04-14-19 @ 11:55)  POCT Blood Glucose.: 163 mg/dL (04-14-19 @ 08:20)  POCT Blood Glucose.: 209 mg/dL (04-14-19 @ 00:31)  POCT Blood Glucose.: 210 mg/dL (04-13-19 @ 22:37)  POCT Blood Glucose.: 369 mg/dL (04-13-19 @ 18:38)  POCT Blood Glucose.: 530 mg/dL (04-13-19 @ 16:03)  POCT Blood Glucose.: 492 mg/dL (04-13-19 @ 16:02)                            14.7   6.9   )-----------( 179      ( 15 Apr 2019 06:50 )             44.7       04-15    141  |  102  |  10  ----------------------------<  153<H>  4.1   |  24  |  0.62    Ca    9.6      15 Apr 2019 06:47  Phos  3.7     04-15  Mg     1.7     04-15        Thyroid Function Tests:  04-13 @ 22:29 TSH 2.50 FreeT4 -- T3 -- Anti TPO -- Anti Thyroglobulin Ab -- TSI --      Hemoglobin A1C, Whole Blood: 12.4 % <H> [4.0 - 5.6] (04-13-19 @ 22:40)            Contact number: maria e [phone] or [phone]

## 2019-04-16 NOTE — PROGRESS NOTE ADULT - PROBLEM SELECTOR PLAN 1
-Decrease Humalog to 8 units SQ TID,   -Tomorrow decrease Lantus to 25 units SQ at 7pm   -moderate correction scale SQ qac and qhs  - consistent carb diet  -Asked RN/CDE Susan Rouse to see patient to review physical activity. I discussed briefly that he should not work out if his bs is above 250.  -discussed with primary team  DISPO: Patient must go home with basal/bolus as he was admitted with DKA. Discussed with patient and team that orals would not be an option. His CORRINE labs would be back next week when he meets with the educator at our practice.   -f/u at our practice Friday 4/26/19 at 11am with RN/CDE Blanca Perez and Wednesday 7/10/19 at 330PM with Dr Shelly Weaver. Both appointments at [address] [place] [address] [phone].  -Inquire about a CGM at outpt appt.
-test BG AC/HS  -c/w Lantus and Humalog doses as ordered while inpatient.   Discharge plan: Primary team verified coverage and cost of insulin.   Novolin 70/30 Pens 30 units w/breakfast, 20 units w/dinner.   -Send RX for Glucometer, test strips, lancets, ab pen needles to pharmacy. RN to provide teaching on glucometer and review 10 knows of diabetes discharge.   His CORRINE labs would be back next week when he meets with the educator at our practice.   -f/u at our practice Friday 4/26/19 at 11am with RN/CDE Blanca Perez and Wednesday 7/10/19 at 330PM with Dr Shelly Weaver. Both appointments at [address] [place] [address], [address] [phone].  -discussed plan w/pt, RN, RN-CDE and team  pager: [phone]  -Will
- On admission, DKA (AG is 20, BG in 500's, BHB 2.8 with moderate ketones in urine).   - AG closed, now on B/B regimen   - Advanced diet   - monitor FS q8h for now, c/w B/B and ISS coverage   - Endocrine recs appreciated,, will start 27 units of lantus, and 9 units of humalog pre meal, final d/c recs pending
- On admission, DKA (AG is 20, BG in 500's, BHB 2.8 with moderate ketones in urine).   - AG closed, now on B/B regimen   - Advanced diet   - monitor FS q8h for now, c/w B/B and ISS coverage   - Endocrine recs appreciated,, will d/c with 25 units of basiglar and 8 units of Novolog pre meal
- On admission, DKA (AG is 20, BG in 500's, BHB 2.8 with moderate ketones in urine).   - s/p 10u humalog and lantus 25u yesterday now with downtrending FS and decreasing AG.  - Advanced diet   - c/w aggressive IVF hydration  - monitor FS q8h for now, place on sliding scale insulin    - Endocrine recs appreciated,, will start 27 units of lantus, and 9 units of humalog pre meal
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
## 2019-04-17 ENCOUNTER — INBOUND DOCUMENT (OUTPATIENT)
Age: 59
End: 2019-04-17

## 2019-04-17 LAB — ISLET CELL512 AB SER-ACNC: SIGNIFICANT CHANGE UP

## 2019-04-18 LAB
GAD65 AB SER-MCNC: 0 NMOL/L — SIGNIFICANT CHANGE UP
ZINC TRANSPORTER 8 AB, RESULT: <10 U/ML — SIGNIFICANT CHANGE UP (ref 0–15)

## 2019-04-22 ENCOUNTER — APPOINTMENT (OUTPATIENT)
Dept: ORTHOPEDIC SURGERY | Facility: CLINIC | Age: 59
End: 2019-04-22
Payer: OTHER MISCELLANEOUS

## 2019-04-22 PROCEDURE — 99024 POSTOP FOLLOW-UP VISIT: CPT

## 2019-04-22 NOTE — HISTORY OF PRESENT ILLNESS
[___ Months Post Op] : [unfilled] months post op [3] : the patient reports pain that is 3/10 in severity [Clean/Dry/Intact] : clean, dry and intact [Neuro Intact] : an unremarkable neurological exam [No Sign of Infection] : is showing no signs of infection [No Work] : not to work [No Housework] : not to do housework [Doing Well] : is doing well [Excellent Pain Control] : has excellent pain control [No ADLs] : to avoid most activities of daily living [de-identified] : s/p posterior L3-L5 lumbar laminectomy and discectomy on 02/13/19 [de-identified] : 59 yo male with diabetic ketoacidosis, with hospitalization , BS now improved, Overall radiculopathy, paresthesias improved.  [de-identified] : Improved neurologically(from prior to surgery) [de-identified] : 59 yo male with laminectomy with improved symptoms overall, recommend home exercise program, PT, RTO 6 weeks. I would cancel MRI, not necessary as symptoms related to DKA.

## 2019-04-23 LAB — INSULIN HUMAN IGE QN: <0.4 U/ML — SIGNIFICANT CHANGE UP

## 2019-04-26 ENCOUNTER — APPOINTMENT (OUTPATIENT)
Dept: ENDOCRINOLOGY | Facility: CLINIC | Age: 59
End: 2019-04-26
Payer: MEDICARE

## 2019-04-26 DIAGNOSIS — E11.65 TYPE 2 DIABETES MELLITUS WITH HYPERGLYCEMIA: ICD-10-CM

## 2019-04-26 PROCEDURE — G0108 DIAB MANAGE TRN  PER INDIV: CPT

## 2019-04-26 RX ORDER — LANCETS 33 GAUGE
EACH MISCELLANEOUS
Qty: 4 | Refills: 1 | Status: ACTIVE | COMMUNITY
Start: 2019-04-26

## 2019-04-27 ENCOUNTER — RX RENEWAL (OUTPATIENT)
Age: 59
End: 2019-04-27

## 2019-04-30 ENCOUNTER — RX RENEWAL (OUTPATIENT)
Age: 59
End: 2019-04-30

## 2019-05-01 ENCOUNTER — RX RENEWAL (OUTPATIENT)
Age: 59
End: 2019-05-01
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
## 2019-06-19 ENCOUNTER — APPOINTMENT (OUTPATIENT)
Dept: CARDIOLOGY | Facility: CLINIC | Age: 59
End: 2019-06-19
Payer: MEDICARE

## 2019-06-19 ENCOUNTER — NON-APPOINTMENT (OUTPATIENT)
Age: 59
End: 2019-06-19

## 2019-06-19 VITALS
DIASTOLIC BLOOD PRESSURE: 73 MMHG | WEIGHT: 239 LBS | BODY MASS INDEX: 30.67 KG/M2 | HEART RATE: 72 BPM | SYSTOLIC BLOOD PRESSURE: 124 MMHG | OXYGEN SATURATION: 97 % | HEIGHT: 74 IN

## 2019-06-19 PROCEDURE — 99214 OFFICE O/P EST MOD 30 MIN: CPT

## 2019-06-19 PROCEDURE — 93000 ELECTROCARDIOGRAM COMPLETE: CPT

## 2019-06-19 RX ORDER — METFORMIN HYDROCHLORIDE 500 MG/1
500 TABLET, COATED ORAL
Qty: 120 | Refills: 1 | Status: ACTIVE | COMMUNITY
Start: 2019-06-19 | End: 1900-01-01

## 2019-06-19 NOTE — PHYSICAL EXAM
[General Appearance - In No Acute Distress] : no acute distress [Well Groomed] : well groomed [Normal Conjunctiva] : the conjunctiva exhibited no abnormalities [Normal Oral Mucosa] : normal oral mucosa [Eyelids - No Xanthelasma] : the eyelids demonstrated no xanthelasmas [No Oral Pallor] : no oral pallor [No Oral Cyanosis] : no oral cyanosis [Normal Jugular Venous A Waves Present] : normal jugular venous A waves present [Normal Jugular Venous V Waves Present] : normal jugular venous V waves present [No Jugular Venous Anugiano A Waves] : no jugular venous anguiano A waves [Respiration, Rhythm And Depth] : normal respiratory rhythm and effort [Exaggerated Use Of Accessory Muscles For Inspiration] : no accessory muscle use [Auscultation Breath Sounds / Voice Sounds] : lungs were clear to auscultation bilaterally [Abdomen Soft] : soft [Abdomen Tenderness] : non-tender [Abdomen Mass (___ Cm)] : no abdominal mass palpated [Abnormal Walk] : normal gait [Cyanosis, Localized] : no localized cyanosis [Nail Clubbing] : no clubbing of the fingernails [Petechial Hemorrhages (___cm)] : no petechial hemorrhages [Skin Color & Pigmentation] : normal skin color and pigmentation [No Venous Stasis] : no venous stasis [] : no rash [Skin Lesions] : no skin lesions [No Skin Ulcers] : no skin ulcer [No Xanthoma] : no  xanthoma was observed [Affect] : the affect was normal [Oriented To Time, Place, And Person] : oriented to person, place, and time [Mood] : the mood was normal [No Anxiety] : not feeling anxious [Normal Rate] : normal [Rhythm Regular] : regular [Normal S2] : normal S2 [Normal S1] : normal S1 [No Gallop] : no gallop heard [No Murmur] : no murmurs heard [2+] : left 2+ [No Pitting Edema] : no pitting edema present [Right Carotid Bruit] : no bruit heard over the right carotid [Bruit] : no bruit heard [Left Carotid Bruit] : no bruit heard over the left carotid

## 2019-06-19 NOTE — HISTORY OF PRESENT ILLNESS
[FreeTextEntry1] : 58 year old man with a history of cauda equina syndrome, hyperlipidemia, early family history of CAD, coronary calcifications, smoker.\par \par He was sent to [place] for a cardiac CT by his primary cardiologist and was aborted secondary to calcium. Though he underwent a pharmacological nuclear stress test in our office on 1/25/19 that was not significant for ischemia. He was noted to have increased PVCs. \par \par He is now status post lumbar laminectomy. \par \par \par Since his last visit he was admitted to [place] and found to be significantly hyperglycemic. His A1C went to 12.9.  \par He   denies any chest pain, PND, orthopnea, lower extremity edema, near syncope, syncope, strokelike symptoms.  He is compliant with his medications. \par \par

## 2019-06-19 NOTE — DISCUSSION/SUMMARY
[FreeTextEntry1] : 58 year man with a history as listed presents for a followup cardiac evaluation. \par Mark doing well from a cardiac standpoint.  He had a recent coronary CT with significant calcification in all vessels. Though the pharmacological nuclear stress test on 1/25/19 was unrevealing for ischemia. He frequent PVCs noted on the stress that likely was reactive. His EKG today was normal. He had an echo in  that showed normal systolic LV function without any significant other findings, including no significant valvular disease. \par He currently he denies any anginal symptoms. Clinically he is euvolemic on exam. His EKG did not reveal any significant ischemic changes. \par He was recently had an elevated A1C (12.9). It is hard to believe that he went from a reported A1C of 5.x to >12. I think this is a function of his steroid use from his back and diet. He will continue on metformin for now. He is seeing a endocrinologist soon. \par His blood pressure is controlled.  He will continue  Toprol  100mg Qday for his BP and PVCs. \par He will continue with statin therapy. At your convenience, please fax me his latest lab results including lipid profile. Given his new DM and elevated Triglycerides he will start vascepa. \par Smoking cessation counseling was performed. \par He will followup with me in 3 months. \par

## 2019-07-02 ENCOUNTER — RX RENEWAL (OUTPATIENT)
Age: 59
End: 2019-07-02

## 2019-07-02 RX ORDER — BLOOD SUGAR DIAGNOSTIC
STRIP MISCELLANEOUS 3 TIMES DAILY
Qty: 200 | Refills: 0 | Status: ACTIVE | COMMUNITY
Start: 2019-04-26 | End: 1900-01-01

## 2019-07-10 ENCOUNTER — APPOINTMENT (OUTPATIENT)
Dept: ENDOCRINOLOGY | Facility: CLINIC | Age: 59
End: 2019-07-10

## 2019-07-22 ENCOUNTER — APPOINTMENT (OUTPATIENT)
Dept: ORTHOPEDIC SURGERY | Facility: CLINIC | Age: 59
End: 2019-07-22
Payer: OTHER MISCELLANEOUS

## 2019-07-22 PROCEDURE — 99214 OFFICE O/P EST MOD 30 MIN: CPT

## 2019-07-22 RX ORDER — PEN NEEDLE, DIABETIC 29 G X1/2"
32G X 4 MM NEEDLE, DISPOSABLE MISCELLANEOUS
Qty: 200 | Refills: 0 | Status: ACTIVE | COMMUNITY
Start: 2019-04-16

## 2019-07-22 RX ORDER — BLOOD-GLUCOSE METER
W/DEVICE EACH MISCELLANEOUS
Qty: 1 | Refills: 0 | Status: ACTIVE | COMMUNITY
Start: 2019-04-16

## 2019-07-22 RX ORDER — DEXAMETHASONE 1 MG/1
1 TABLET ORAL
Qty: 4 | Refills: 0 | Status: ACTIVE | COMMUNITY
Start: 2019-02-15

## 2019-07-22 RX ORDER — ISOPROPYL ALCOHOL 70 ML/100ML
70 SWAB TOPICAL
Qty: 200 | Refills: 0 | Status: ACTIVE | COMMUNITY
Start: 2019-04-16

## 2019-07-22 RX ORDER — INSULIN ASPART 100 [IU]/ML
100 INJECTION, SOLUTION INTRAVENOUS; SUBCUTANEOUS
Qty: 15 | Refills: 0 | Status: ACTIVE | COMMUNITY
Start: 2019-04-16

## 2019-07-22 RX ORDER — INSULIN GLARGINE 100 [IU]/ML
100 INJECTION, SOLUTION SUBCUTANEOUS
Qty: 15 | Refills: 0 | Status: ACTIVE | COMMUNITY
Start: 2019-04-16

## 2019-07-24 ENCOUNTER — RX RENEWAL (OUTPATIENT)
Age: 59
End: 2019-07-24

## 2019-08-09 ENCOUNTER — APPOINTMENT (OUTPATIENT)
Dept: ORTHOPEDIC SURGERY | Facility: CLINIC | Age: 59
End: 2019-08-09
Payer: MEDICARE

## 2019-08-09 DIAGNOSIS — M25.512 PAIN IN LEFT SHOULDER: ICD-10-CM

## 2019-08-09 DIAGNOSIS — G89.29 PAIN IN LEFT SHOULDER: ICD-10-CM

## 2019-08-09 PROCEDURE — 20610 DRAIN/INJ JOINT/BURSA W/O US: CPT | Mod: LT

## 2019-08-09 PROCEDURE — 99214 OFFICE O/P EST MOD 30 MIN: CPT | Mod: 25

## 2019-08-09 NOTE — PHYSICAL EXAM
[de-identified] : General Exam\par \par Well developed, well nourished\par No apparent distress\par Oriented to person, place, and time\par Mood: Normal\par Affect: Normal\par Balance and coordination: Normal\par Gait: Normal\par \par Left shoulder exam\par \par Inspection: No swelling, ecchymosis or gross deformity.\par Skin: No masses, No lesions\par Neck: Negative Spurlings, full ROM without pain\par Tenderness: No bicipital tenderness, no tenderness to the greater tuberosity/RTC insertion, no anterior shoulder/lesser tuberosity tenderness. No tenderness SC joint, clavicle, AC joint.\par ROM: 160/60/T6\par Impingement tests: Positive Arrieta\par AC Joint: no pain with cross arm testing\par Biceps: Negative speed\par Strength: 5/5 abduction, external rotation, and internal rotation\par Neuro: AIN, PIN, Ulnar nerve motor intact\par Sensation: Intact to light touch in radial, median, ulnar, and axillary nerve distributions\par Vasc: 2+ radial pulse\par  [de-identified] : \par The following radiographs were ordered and read by me during this patients visit. I reviewed each radiograph in detail with the patient and discussed the findings as highlighted below. \par \par 3 views left shoulder obtained today. There is moderate glenohumeral arthritis with joint space narrowing. Postsurgical changes of the acromioclavicular joint\par \par

## 2019-08-09 NOTE — DISCUSSION/SUMMARY
[de-identified] : 59-year-old male left shoulder glenohumeral osteoarthritis.I discussed the treatment of degenerative arthritis with the patient at length today. I described the spectrum of treatment from nonoperative modalities to total joint arthroplasty. Noninvasive and nonoperative treatment modalities include weight reduction, activity modification with low impact exercise, PRN use of acetaminophen or anti-inflammatory medication if tolerated, glucosamine/chondroitin supplements, and physical therapy. Further treatments can include corticosteroid injection and the use of hyaluronic acid injections. Definitive treatment can certainly include total joint arthroplasty also. The risks and benefits of each treatment options was discussed and all questions were answered.\par \par Injection: Left shoulder (glenohumeral joint\par Indication: Glenohumeral arthritis\par \par A discussion was had with the patient regarding this procedure and all questions were answered. All risks, benefits and alternatives were discussed. These included but were not limited to bleeding, infection, and allergic reaction. Alcohol was used to clean the skin, and betadine was used to sterilize and prep the area in the posterior aspect of the left shoulder. Ethyl chloride spray was then used as a topical anesthetic. A 21-gauge needle was used to inject 4cc of 1% lidocaine and 1cc of 40mg/ml methylprednisolone into the left glenohumeral joint. A sterile bandage was then applied. The patient tolerated the procedure well and there were no complications.

## 2019-08-09 NOTE — HISTORY OF PRESENT ILLNESS
[de-identified] : 60yo male presents complaining of chronic left shoulder pain for many years. He is a history of a left shoulder surgery, arthroscopy in 2004. He was evaluated by Dr. take her last year at [facility]. He was told he may need surgery again. He feels something shifting inside the shoulder. He has pain with overhead activity reaching behind back. Overall symptoms stable. Denies numbness/ting\par \par The patient's past medical history, past surgical history, medications, allergies, and social history were reviewed by me today with the patient and documented accordingly. In addition, the patient's family history, which is noncontributory to this visit, was also reviewed.\par

## 2019-10-16 ENCOUNTER — NON-APPOINTMENT (OUTPATIENT)
Age: 59
End: 2019-10-16

## 2019-10-16 ENCOUNTER — APPOINTMENT (OUTPATIENT)
Dept: CARDIOLOGY | Facility: CLINIC | Age: 59
End: 2019-10-16
Payer: MEDICARE

## 2019-10-16 ENCOUNTER — RX RENEWAL (OUTPATIENT)
Age: 59
End: 2019-10-16

## 2019-10-16 VITALS — SYSTOLIC BLOOD PRESSURE: 128 MMHG | DIASTOLIC BLOOD PRESSURE: 80 MMHG

## 2019-10-16 VITALS
HEART RATE: 74 BPM | HEIGHT: 74 IN | BODY MASS INDEX: 31.06 KG/M2 | DIASTOLIC BLOOD PRESSURE: 79 MMHG | WEIGHT: 242 LBS | OXYGEN SATURATION: 98 % | SYSTOLIC BLOOD PRESSURE: 147 MMHG

## 2019-10-16 PROCEDURE — 93000 ELECTROCARDIOGRAM COMPLETE: CPT

## 2019-10-16 PROCEDURE — 99214 OFFICE O/P EST MOD 30 MIN: CPT

## 2019-10-16 NOTE — DISCUSSION/SUMMARY
[FreeTextEntry1] : 59 year man with a history as listed presents for a followup cardiac evaluation. \par \par Mark doing well from a cardiac standpoint.  He currently he denies any anginal symptoms. Clinically he is euvolemic on exam. His EKG did not reveal any significant ischemic changes. \par \par He had a  coronary CT in 12/2018 with significant calcification in all vessels. Though the pharmacological nuclear stress test on 1/25/19 was unrevealing for ischemia. He frequent PVCs noted on the stress that likely was reactive. His EKG today was normal. He had an echo in  that showed normal systolic LV function without any significant other findings, including no significant valvular disease. \par \par  He will continue on metformin for now. He is seeing a endocrinologist soon. Hopefully his next A1C will normalize and he can come off the medication. \par \par His blood pressure is controlled.  He will continue  Toprol  100mg Qday for his BP and PVCs. \par \par He will continue with statin therapy.  Given his new DM and elevated Triglycerides he continue  vascepa. He will continue with statin therapy to achieve maintain goal LDL<100 or ideally <70. At your convenience, please fax me his latest lab results including lipid profile.\par \par Smoking cessation counseling was performed. \par He will followup with me in 4-6 months. \par

## 2019-10-16 NOTE — HISTORY OF PRESENT ILLNESS
[FreeTextEntry1] : 59 year old man with a history of cauda equina syndrome, hyperlipidemia, early family history of CAD, coronary calcifications, smoker.\par He was sent to [place] for a cardiac CT by his primary cardiologist and was aborted secondary to calcium. Though he underwent a pharmacological nuclear stress test in our office on 1/25/19 that was not significant for ischemia. He was noted to have increased PVCs. \par \par He is now status post lumbar laminectomy. \par \par Since his last visit he is feeling well. He has some intermittent back pain. He   denies any chest pain, PND, orthopnea, lower extremity edema, near syncope, syncope, strokelike symptoms.  He is compliant with his medications. \par He is keeping a healthy diet. He is exercise in the gym at leas 3-4 days a week for 2 hours at a time. \par \par

## 2019-10-23 ENCOUNTER — APPOINTMENT (OUTPATIENT)
Dept: ORTHOPEDIC SURGERY | Facility: CLINIC | Age: 59
End: 2019-10-23
Payer: MEDICARE

## 2019-10-23 DIAGNOSIS — M19.012 PRIMARY OSTEOARTHRITIS, LEFT SHOULDER: ICD-10-CM

## 2019-10-23 PROCEDURE — 20610 DRAIN/INJ JOINT/BURSA W/O US: CPT | Mod: LT

## 2019-10-23 PROCEDURE — 99213 OFFICE O/P EST LOW 20 MIN: CPT | Mod: 25

## 2019-10-23 NOTE — DISCUSSION/SUMMARY
[de-identified] : 59-year-old male left shoulder glenohumeral osteoarthritis.I discussed the treatment of degenerative arthritis with the patient at length today. I described the spectrum of treatment from nonoperative modalities to total joint arthroplasty. Noninvasive and nonoperative treatment modalities include weight reduction, activity modification with low impact exercise, PRN use of acetaminophen or anti-inflammatory medication if tolerated, glucosamine/chondroitin supplements, and physical therapy. Further treatments can include corticosteroid injection and the use of hyaluronic acid injections. Definitive treatment can certainly include total joint arthroplasty also. The risks and benefits of each treatment options was discussed and all questions were answered.\par \par Injection: Left shoulder (glenohumeral joint\par Indication: Glenohumeral arthritis\par \par A discussion was had with the patient regarding this procedure and all questions were answered. All risks, benefits and alternatives were discussed. These included but were not limited to bleeding, infection, and allergic reaction. Alcohol was used to clean the skin, and betadine was used to sterilize and prep the area in the posterior aspect of the left shoulder. Ethyl chloride spray was then used as a topical anesthetic. A 21-gauge needle was used to inject 4cc of 1% lidocaine and 1cc of 40mg/ml methylprednisolone into the left glenohumeral joint. A sterile bandage was then applied. The patient tolerated the procedure well and there were no complications.

## 2019-10-23 NOTE — REASON FOR VISIT
Spoke to [facility] and asked them to [facility] [employer] [facility] [place].  Patient notified.   [Follow-Up Visit] : a follow-up visit for [FreeTextEntry2] : L shoulder pain

## 2019-10-23 NOTE — PHYSICAL EXAM
[de-identified] : Left shoulder exam\par \par Inspection: No swelling, ecchymosis or gross deformity.\par Skin: No masses, No lesions\par Neck: Negative Spurlings, full ROM without pain\par Tenderness: No bicipital tenderness, no tenderness to the greater tuberosity/RTC insertion, no anterior shoulder/lesser tuberosity tenderness. No tenderness SC joint, clavicle, AC joint.\par ROM: 160/60/T6\par Impingement tests: Positive Arrieta\par AC Joint: no pain with cross arm testing\par Biceps: Negative speed\par Strength: 5/5 abduction, external rotation, and internal rotation\par Neuro: AIN, PIN, Ulnar nerve motor intact\par Sensation: Intact to light touch in radial, median, ulnar, and axillary nerve distributions\par Vasc: 2+ radial pulse

## 2019-10-23 NOTE — HISTORY OF PRESENT ILLNESS
[de-identified] : 58yo male presents complaining of chronic left shoulder pain for many years. He is a history of a left shoulder surgery, arthroscopy in 2004. He was evaluated by Dr. take her last year at [facility]. He was told he may need surgery again. He feels something shifting inside the shoulder. He has pain with overhead activity reaching behind back. Overall symptoms stable. Denies numbness/ting.  Underwent cortisone injection last visit in August with relief, requesting another.

## 2019-10-28 ENCOUNTER — APPOINTMENT (OUTPATIENT)
Dept: ORTHOPEDIC SURGERY | Facility: CLINIC | Age: 59
End: 2019-10-28
Payer: OTHER MISCELLANEOUS

## 2019-10-28 DIAGNOSIS — M54.16 RADICULOPATHY, LUMBAR REGION: ICD-10-CM

## 2019-10-28 PROCEDURE — 99214 OFFICE O/P EST MOD 30 MIN: CPT

## 2019-12-04 NOTE — H&P PST ADULT - PAIN, CHRONIC: FACTORS THAT AGGRAVATE, PROFILE
Izabela Seo presents today for   Chief Complaint   Patient presents with    Routine Prenatal Visit       Is someone accompanying this pt? no  atient does not have any concerns at this time. Patient no birth control at this time . Depression screening     Is the patient using any DME equipment during OV? no    Depression Screening:  No flowsheet data found. Learning Assessment:  No flowsheet data found. Abuse Screening:  No flowsheet data found. Fall Risk  No flowsheet data found. This Visit Test  Results for orders placed or performed during the hospital encounter of 10/07/19   CBC WITH AUTOMATED DIFF   Result Value Ref Range    WBC 7.7 4.6 - 13.2 K/uL    RBC 3.43 (L) 4.20 - 5.30 M/uL    HGB 11.1 (L) 12.0 - 16.0 g/dL    HCT 34.0 (L) 35.0 - 45.0 %    MCV 99.1 (H) 74.0 - 97.0 FL    MCH 32.4 24.0 - 34.0 PG    MCHC 32.6 31.0 - 37.0 g/dL    RDW 12.8 11.6 - 14.5 %    PLATELET 433 110 - 609 K/uL    MPV 9.5 9.2 - 11.8 FL    NEUTROPHILS 74 (H) 40 - 73 %    LYMPHOCYTES 20 (L) 21 - 52 %    MONOCYTES 5 3 - 10 %    EOSINOPHILS 1 0 - 5 %    BASOPHILS 0 0 - 2 %    ABS. NEUTROPHILS 5.6 1.8 - 8.0 K/UL    ABS. LYMPHOCYTES 1.6 0.9 - 3.6 K/UL    ABS. MONOCYTES 0.4 0.05 - 1.2 K/UL    ABS. EOSINOPHILS 0.1 0.0 - 0.4 K/UL    ABS. BASOPHILS 0.0 0.0 - 0.1 K/UL    DF AUTOMATED     GLUCOSE, GESTATIONAL 1 HR TOLERANCE   Result Value Ref Range    GESTATIONAL GLUC 1HR 123 60 - 140 MG/DL       Health Maintenance reviewed and discussed and ordered per Provider. Health Maintenance Due   Topic Date Due    HPV Age 9Y-34Y (1 - Female 2-dose series) 07/23/2009    PAP AKA CERVICAL CYTOLOGY  07/23/2019    Influenza Age 9 to Adult  08/01/2019    OB 3RD TRIMESTER TDAP  10/09/2019   . Coordination of Care:  1. Have you been to the ER, urgent care clinic since your last visit? Hospitalized since your last visit? no    2.  Have you seen or consulted any other health care providers outside of the [address] [place] [address] since your last visit? Include any pap smears or colon screening. no          After obtaining consent, and per orders of Dr. Paulette Haque, injection of TDAP and FLU given by Nilton Holland LPN. Patient instructed to remain in clinic for 20 minutes afterwards, and to report any adverse reaction to me immediately. Patient unable to void. activity activity/movement

## 2020-02-20 NOTE — ED ADULT NURSE NOTE - CHIEF COMPLAINT
The patient is a 58y Male complaining of weakness. Regular rate and rhythm, Heart sounds S1 S2 present, no murmurs, rubs or gallops

## 2020-03-16 ENCOUNTER — RX RENEWAL (OUTPATIENT)
Age: 60
End: 2020-03-16

## 2020-03-23 ENCOUNTER — APPOINTMENT (OUTPATIENT)
Dept: CARDIOLOGY | Facility: CLINIC | Age: 60
End: 2020-03-23

## 2020-03-28 RX ORDER — ICOSAPENT ETHYL 1000 MG/1
1 CAPSULE ORAL
Qty: 360 | Refills: 3 | Status: ACTIVE | COMMUNITY
Start: 2019-06-19

## 2020-04-22 ENCOUNTER — RX RENEWAL (OUTPATIENT)
Age: 60
End: 2020-04-22

## 2020-06-03 ENCOUNTER — APPOINTMENT (OUTPATIENT)
Dept: CARDIOLOGY | Facility: CLINIC | Age: 60
End: 2020-06-03

## 2020-07-27 ENCOUNTER — APPOINTMENT (OUTPATIENT)
Dept: ORTHOPEDIC SURGERY | Facility: CLINIC | Age: 60
End: 2020-07-27

## 2020-08-03 ENCOUNTER — APPOINTMENT (OUTPATIENT)
Dept: ORTHOPEDIC SURGERY | Facility: CLINIC | Age: 60
End: 2020-08-03

## 2020-08-31 ENCOUNTER — RX RENEWAL (OUTPATIENT)
Age: 60
End: 2020-08-31

## 2020-09-10 ENCOUNTER — APPOINTMENT (OUTPATIENT)
Dept: CARDIOLOGY | Facility: CLINIC | Age: 60
End: 2020-09-10
Payer: MEDICARE

## 2020-09-10 ENCOUNTER — NON-APPOINTMENT (OUTPATIENT)
Age: 60
End: 2020-09-10

## 2020-09-10 VITALS
DIASTOLIC BLOOD PRESSURE: 89 MMHG | HEIGHT: 74 IN | OXYGEN SATURATION: 98 % | BODY MASS INDEX: 32.34 KG/M2 | HEART RATE: 75 BPM | SYSTOLIC BLOOD PRESSURE: 145 MMHG | WEIGHT: 252 LBS

## 2020-09-10 VITALS — SYSTOLIC BLOOD PRESSURE: 134 MMHG | DIASTOLIC BLOOD PRESSURE: 84 MMHG

## 2020-09-10 PROCEDURE — 99214 OFFICE O/P EST MOD 30 MIN: CPT

## 2020-09-10 PROCEDURE — 93000 ELECTROCARDIOGRAM COMPLETE: CPT

## 2020-09-10 NOTE — DISCUSSION/SUMMARY
[FreeTextEntry1] : 60 year man with a history as listed presents for a followup cardiac evaluation. \par \par Mark doing well from a cardiac standpoint.  He currently he denies any anginal symptoms. Clinically he is euvolemic on exam. His EKG did not reveal any significant ischemic changes. \par \par He had a  coronary CT in 12/2018 with significant calcification in all vessels. Though the pharmacological nuclear stress test on 1/25/19 was unrevealing for ischemia. He frequent PVCs noted on the stress that likely was reactive. His EKG today was normal. He had an echo in  that showed normal systolic LV function without any significant other findings, including no significant valvular disease. \par \par His blood pressure is controlled but mildly elevated from his back pain and weight gain.   He will continue  Toprol  100mg Qday for his BP and PVCs. \par \par He will continue with statin therapy.  Given his new DM and elevated Triglycerides he continue vascepa. He will continue with statin therapy to achieve maintain goal LDL<100 or ideally <70. At your convenience, please fax me his latest lab results including lipid profile.\par \par Smoking cessation counseling was performed. \par He will followup with me in 6 months. \par

## 2020-09-10 NOTE — HISTORY OF PRESENT ILLNESS
[FreeTextEntry1] : 60 year old man with a history of cauda equina syndrome, hyperlipidemia, early family history of CAD, coronary calcifications, smoker.\par He was sent to [place] for a cardiac CT by his primary cardiologist and was aborted secondary to calcium. Though he underwent a pharmacological nuclear stress test in our office on 1/25/19 that was not significant for ischemia. He was noted to have increased PVCs. \par \par He is now status post lumbar laminectomy. \par \par Since his last visit he is feeling well. \par he has been quarantined at home because of the COVID pandemic.\par He has some intermittent back pain. He   denies any chest pain, PND, orthopnea, lower extremity edema, near syncope, syncope, strokelike symptoms.  He is compliant with his medications. \par He is keeping a healthy diet.

## 2020-09-10 NOTE — PHYSICAL EXAM
[Well Groomed] : well groomed [General Appearance - In No Acute Distress] : no acute distress [Eyelids - No Xanthelasma] : the eyelids demonstrated no xanthelasmas [Normal Conjunctiva] : the conjunctiva exhibited no abnormalities [No Oral Pallor] : no oral pallor [Normal Oral Mucosa] : normal oral mucosa [No Oral Cyanosis] : no oral cyanosis [Normal Jugular Venous V Waves Present] : normal jugular venous V waves present [Normal Jugular Venous A Waves Present] : normal jugular venous A waves present [No Jugular Venous Anguiano A Waves] : no jugular venous anguiano A waves [Respiration, Rhythm And Depth] : normal respiratory rhythm and effort [Exaggerated Use Of Accessory Muscles For Inspiration] : no accessory muscle use [Auscultation Breath Sounds / Voice Sounds] : lungs were clear to auscultation bilaterally [Abdomen Soft] : soft [Abdomen Tenderness] : non-tender [Abdomen Mass (___ Cm)] : no abdominal mass palpated [Abnormal Walk] : normal gait [Nail Clubbing] : no clubbing of the fingernails [Petechial Hemorrhages (___cm)] : no petechial hemorrhages [Cyanosis, Localized] : no localized cyanosis [Skin Color & Pigmentation] : normal skin color and pigmentation [] : no rash [Skin Lesions] : no skin lesions [No Venous Stasis] : no venous stasis [No Skin Ulcers] : no skin ulcer [No Xanthoma] : no  xanthoma was observed [Oriented To Time, Place, And Person] : oriented to person, place, and time [No Anxiety] : not feeling anxious [Affect] : the affect was normal [Mood] : the mood was normal [Normal Rate] : normal [Rhythm Regular] : regular [Normal S1] : normal S1 [Normal S2] : normal S2 [No Gallop] : no gallop heard [No Murmur] : no murmurs heard [Right Carotid Bruit] : no bruit heard over the right carotid [2+] : left 2+ [Bruit] : no bruit heard [Left Carotid Bruit] : no bruit heard over the left carotid [No Pitting Edema] : no pitting edema present

## 2020-09-19 NOTE — PRE-OP CHECKLIST - IDENTIFICATION BAND VERIFIED
Fall at 7:30 pm on \"uneven concrete\" landing on right side.  Right hand swelling.  Right face abrasion.  Broken right front tooth.  Denies LOC.  Ibuprofen taken right after falling.  Denies head pain but does state \"weirdness or blurriness\" in right eye.    done

## 2020-09-30 NOTE — PACU DISCHARGE NOTE - AIRWAY PATENCY:
History and Physical      Name:  Jocelynn Fontanez /Age/Sex: 1971  (51 y.o. female)   MRN & CSN:  [id number] & [id number] Admission Date/Time: 2020  7:05 PM   Location:  ED[facility]/ED-17 PCP: CHRISTINA Moore - NP       Admitting Physicians : Dr. Jay North is a 51 y.o.  female  who presents with Chest Pain; Hypertension; and Headache        Assessment and Plan:   Chest pain likely 2/2 uncontrolled HTN  R/o ACS. Initial troponin negative.   -Start ASA and statins  -Serial troponin  -Echo  -Lipid panel  -Cardiology consult    HTN, uncontrolled  -Continue Prinzide and Amlodipine    Obesity class 2 with BMI 36.3  -Educated about lifestyle modifications      Other chronic Issues  -HLD  -Anxiety  -Insomnia  -Lumbar degenerative disc disease      DVT-PPX: Lovenox  Diet: Cardiac      Medications:   Medications:    Infusions:   PRN Meds:   No current facility-administered medications for this encounter. Current Outpatient Medications:     lisinopril-hydroCHLOROthiazide (PRINZIDE;ZESTORETIC) 10-12.5 MG per tablet, Take 1 tablet by mouth daily, Disp: 90 tablet, Rfl: 0    sertraline (ZOLOFT) 25 MG tablet, Take 1 tablet by mouth daily, Disp: 90 tablet, Rfl: 0    amLODIPine (NORVASC) 5 MG tablet, Take 1 tablet by mouth daily, Disp: 30 tablet, Rfl: 0    fluticasone (FLONASE) 50 MCG/ACT nasal spray, 1 spray by Nasal route daily, Disp: 1 Bottle, Rfl: 2    melatonin (MELATONIN MAXIMUM STRENGTH) 5 MG TABS tablet, Take 1 tablet by mouth nightly as needed (insomnia), Disp: 30 tablet, Rfl: 2    Cholecalciferol (VITAMIN D-3 SUPER STRENGTH) 50 MCG ( UT) TABS, Take 1 tablet by mouth daily, Disp: 30 tablet, Rfl: 2    PREMPRO 0.45-1.5 MG per tablet, Take 1 tablet by mouth daily, Disp: 28 tablet, Rfl: 2    hydrOXYzine (ATARAX) 25 MG tablet, Take 25 mg by mouth 2 times daily as needed, Disp: , Rfl:     History of present illness     Chief Complaint: Chest Pain;  Hypertension; and Headache      Kennedy Vega Kate Hernandez is a 51 y.o.  female  who presents with 7/10 constant chest pain with radiation to the right arm. Other associated symptoms are elevated blood pressure and headache. Patient states she is on Lisinopril and yesterday she was started on Norvasc. Patient states she has been stressed lately. Her son with disability move back to her house with 4 children. She states he makes $715 monthly for disability and she has been the one taking care of her grandchildren. Patient states she is also the caretaker of the significant other who is also suffering from congestive heart failure and colon cancer. She states she is unable to take care of his son and grandkids as she is already a caretaker of the significant other. She attributes her symptoms to current stress. Denies shortness of breath, fever,palpitation, diaphoretic, chills, or cough. History of HTN, insomnia, neuropathy, obesity, HLD and anxiety. Review of Systems   Ten point ROS reviewed and negative, unless as noted below. GENERAL:  Denies fever, chills, night sweats, or changes in weight. EYES:  Denies recent visual changes. ENT:  Denies ear pain, hearing loss or tinnitus  RESP:  Denies any cough, dyspnea, or wheezing. CV:+chest pain, palpitations, syncope, or edema. GI:  Denies any dysphagia, nausea, vomiting, abdominal pain, heartburn, changes in bowel habit, melena or rectal bleeding  MUSCULOSKELETAL:  Denies any joint swelling, joint pain, or loss of range of motion. NEURO:  Denies any headaches, tremors, dizziness, vertigo, memory loss, confusion, weakness, numbness or tingling. PSYCH:  Denies any sleeping problems, history of abuse, marital discord. HEME/LYMPHATIC/IMMUNO:  Denies , bruising, bleeding abnormalities   ENDO:  Denies any heat or cold intolerance, panemiaolyuria or polydipsia.       Objective:   No intake or output data in the 24 hours ending 09/29/20 2156   Vitals:   Vitals:    09/29/20 2153   BP:    Pulse: 67   Resp: 18 Satisfactory Social History     Socioeconomic History    Marital status: Single     Spouse name: None    Number of children: 2    Years of education: None    Highest education level: GED or equivalent   Occupational History    Occupation: home health aide     Employer: [employer]   Social Needs    Financial resource strain: None    Food insecurity     Worry: None     Inability: None    Transportation needs     Medical: None     Non-medical: None   Tobacco Use    Smoking status: Passive Smoke Exposure - Never Smoker    Smokeless tobacco: Never Used   Substance and Sexual Activity    Alcohol use: No    Drug use: No    Sexual activity: Never   Lifestyle    Physical activity     Days per week: None     Minutes per session: None    Stress: None   Relationships    Social connections     Talks on phone: None     Gets together: None     Attends Nondenominational service: None     Active member of club or organization: None     Attends meetings of clubs or organizations: None     Relationship status: None    Intimate partner violence     Fear of current or ex partner: None     Emotionally abused: None     Physically abused: None     Forced sexual activity: None   Other Topics Concern    None   Social History Narrative    None       Electronically signed by CHRISTINA Vila CNP on 9/29/2020 at 9:56 PM

## 2021-01-04 ENCOUNTER — APPOINTMENT (OUTPATIENT)
Dept: ORTHOPEDIC SURGERY | Facility: CLINIC | Age: 61
End: 2021-01-04
Payer: OTHER MISCELLANEOUS

## 2021-01-04 PROCEDURE — 99072 ADDL SUPL MATRL&STAF TM PHE: CPT

## 2021-01-04 PROCEDURE — 99214 OFFICE O/P EST MOD 30 MIN: CPT

## 2021-01-04 PROCEDURE — 72100 X-RAY EXAM L-S SPINE 2/3 VWS: CPT

## 2021-01-04 RX ORDER — GABAPENTIN 100 MG/1
100 CAPSULE ORAL
Qty: 90 | Refills: 1 | Status: ACTIVE | COMMUNITY
Start: 2021-01-04 | End: 1900-01-01

## 2021-01-04 RX ORDER — MELOXICAM 15 MG/1
15 TABLET ORAL
Qty: 30 | Refills: 1 | Status: ACTIVE | COMMUNITY
Start: 2021-01-04 | End: 1900-01-01

## 2021-01-05 ENCOUNTER — FORM ENCOUNTER (OUTPATIENT)
Age: 61
End: 2021-01-05

## 2021-01-15 ENCOUNTER — APPOINTMENT (OUTPATIENT)
Dept: ORTHOPEDIC SURGERY | Facility: CLINIC | Age: 61
End: 2021-01-15
Payer: MEDICARE

## 2021-01-15 VITALS
HEART RATE: 78 BPM | BODY MASS INDEX: 30.16 KG/M2 | HEIGHT: 74 IN | DIASTOLIC BLOOD PRESSURE: 79 MMHG | SYSTOLIC BLOOD PRESSURE: 167 MMHG | WEIGHT: 235 LBS

## 2021-01-15 DIAGNOSIS — M25.511 PAIN IN RIGHT SHOULDER: ICD-10-CM

## 2021-01-15 DIAGNOSIS — G89.29 PAIN IN RIGHT SHOULDER: ICD-10-CM

## 2021-01-15 PROCEDURE — 99072 ADDL SUPL MATRL&STAF TM PHE: CPT

## 2021-01-15 PROCEDURE — 99214 OFFICE O/P EST MOD 30 MIN: CPT | Mod: 25

## 2021-01-15 PROCEDURE — 20610 DRAIN/INJ JOINT/BURSA W/O US: CPT | Mod: RT

## 2021-01-15 PROCEDURE — 73030 X-RAY EXAM OF SHOULDER: CPT | Mod: RT

## 2021-01-15 NOTE — PHYSICAL EXAM
[de-identified] : General Exam\par \par Well developed, well nourished\par No apparent distress\par Oriented to person, place, and time\par Mood: Normal\par Affect: Normal\par Balance and coordination: Normal\par Gait: Normal\par \par Right shoulder exam\par \par Inspection: No swelling, ecchymosis or gross deformity.\par Skin: No masses, No lesions\par Tenderness: No bicipital tenderness, no tenderness to the greater tuberosity/RTC insertion, no anterior shoulder/lesser tuberosity tenderness. No tenderness SC joint, clavicle, AC joint.\par ROM: 160/60/T6\par Impingement tests: Positive Arrieta\par AC Joint: no pain with cross arm testing\par Biceps: Negative speed\par Strength: 5/5 abduction, external rotation, and internal rotation\par Neuro: AIN, PIN, Ulnar nerve motor intact\par Sensation: Intact to light touch in radial, median, ulnar, and axillary nerve distributions\par Vasc: 2+ radial pulse\par  [de-identified] : \par The following radiographs were ordered and read by me during this patients visit. I reviewed each radiograph in detail with the patient and discussed the findings as highlighted below. \par \par 3 views right shoulder obtained today. Glenohumeral arthritis joint space narrowing osteophytes sclerosis\par

## 2021-01-15 NOTE — HISTORY OF PRESENT ILLNESS
[de-identified] : 60-year-old male right shoulder pain for several months he denies any falls or trauma he reports pain throughout the day pain with overhead activities he denies numbness and tingling. He is requesting a corticosteroid injection

## 2021-02-12 ENCOUNTER — RX RENEWAL (OUTPATIENT)
Age: 61
End: 2021-02-12

## 2021-03-11 ENCOUNTER — APPOINTMENT (OUTPATIENT)
Dept: CARDIOLOGY | Facility: CLINIC | Age: 61
End: 2021-03-11
Payer: MEDICARE

## 2021-03-11 ENCOUNTER — NON-APPOINTMENT (OUTPATIENT)
Age: 61
End: 2021-03-11

## 2021-03-11 VITALS
DIASTOLIC BLOOD PRESSURE: 100 MMHG | HEIGHT: 74 IN | WEIGHT: 257 LBS | BODY MASS INDEX: 32.98 KG/M2 | SYSTOLIC BLOOD PRESSURE: 156 MMHG | HEART RATE: 73 BPM | OXYGEN SATURATION: 97 %

## 2021-03-11 VITALS — SYSTOLIC BLOOD PRESSURE: 150 MMHG | DIASTOLIC BLOOD PRESSURE: 78 MMHG

## 2021-03-11 PROCEDURE — 93000 ELECTROCARDIOGRAM COMPLETE: CPT

## 2021-03-11 PROCEDURE — 99072 ADDL SUPL MATRL&STAF TM PHE: CPT

## 2021-03-11 PROCEDURE — 99214 OFFICE O/P EST MOD 30 MIN: CPT

## 2021-03-11 RX ORDER — METOPROLOL SUCCINATE 50 MG/1
50 TABLET, EXTENDED RELEASE ORAL
Qty: 30 | Refills: 0 | Status: DISCONTINUED | COMMUNITY
Start: 2019-01-26 | End: 2021-03-11

## 2021-03-11 NOTE — HISTORY OF PRESENT ILLNESS
[FreeTextEntry1] : 60 year old man with a history of cauda equina syndrome, hyperlipidemia, early family history of CAD, coronary calcifications, smoker.\par He was sent to [place] for a cardiac CT by his primary cardiologist and was aborted secondary to calcium. Though he underwent a pharmacological nuclear stress test in our office on 1/25/19 that was not significant for ischemia. He was noted to have increased PVCs. \par \par He is now status post lumbar laminectomy. \par \par Since his last visit he is feeling well. \par he has been quarantined at home because of the COVID pandemic.\par He has some intermittent back pain. He has been shoveling snow which may have worsened his pain. \par He denies any chest pain, PND, orthopnea, lower extremity edema, near syncope, syncope, strokelike symptoms.  He is compliant with his medications. \par He has put on weight from inactivity.

## 2021-03-11 NOTE — DISCUSSION/SUMMARY
[FreeTextEntry1] : 60 year man with a history as listed presents for a followup cardiac evaluation. \par \par Mark doing well from a cardiac standpoint.  He currently he denies any anginal symptoms. Clinically he is euvolemic on exam. His EKG did not reveal any significant ischemic changes. \par \par He had a  coronary CT in 12/2018 with significant calcification in all vessels. Though the pharmacological nuclear stress test on 1/25/19 was unrevealing for ischemia. He frequent PVCs noted on the stress that likely was reactive. His EKG today was normal. He had an echo in 12/2019   that showed normal systolic LV function without any significant other findings, including no significant valvular disease. \par \par His blood pressure is  elevated from his back pain and weight gain.   He will increase the Toprol  100mg Q12 for his BP and PVCs. \par \par He will continue with statin therapy.  Given his new DM and elevated Triglycerides he continue vascepa. He will continue with statin therapy to achieve maintain goal LDL<100 or ideally <70. At your convenience, please fax me his latest lab results including lipid profile.\par \par Smoking cessation counseling was performed. \par He will followup with me in 4-6 months. \par

## 2021-03-18 LAB
25(OH)D3 SERPL-MCNC: 63.4 NG/ML
ALBUMIN SERPL ELPH-MCNC: 4.5 G/DL
ALP BLD-CCNC: 98 U/L
ALT SERPL-CCNC: 93 U/L
ANION GAP SERPL CALC-SCNC: 11 MMOL/L
AST SERPL-CCNC: 71 U/L
BASOPHILS # BLD AUTO: 0.12 K/UL
BASOPHILS NFR BLD AUTO: 1.5 %
BILIRUB SERPL-MCNC: 0.4 MG/DL
BUN SERPL-MCNC: 16 MG/DL
CALCIUM SERPL-MCNC: 10.1 MG/DL
CHLORIDE SERPL-SCNC: 102 MMOL/L
CHOLEST SERPL-MCNC: 176 MG/DL
CO2 SERPL-SCNC: 26 MMOL/L
CREAT SERPL-MCNC: 0.75 MG/DL
EOSINOPHIL # BLD AUTO: 0.25 K/UL
EOSINOPHIL NFR BLD AUTO: 3 %
ESTIMATED AVERAGE GLUCOSE: 140 MG/DL
FOLATE SERPL-MCNC: >20 NG/ML
GLUCOSE SERPL-MCNC: 120 MG/DL
HBA1C MFR BLD HPLC: 6.5 %
HCT VFR BLD CALC: 42.1 %
HDLC SERPL-MCNC: 53 MG/DL
HGB BLD-MCNC: 14.3 G/DL
IMM GRANULOCYTES NFR BLD AUTO: 0.2 %
LDLC SERPL CALC-MCNC: 51 MG/DL
LYMPHOCYTES # BLD AUTO: 1.91 K/UL
LYMPHOCYTES NFR BLD AUTO: 23.2 %
MAN DIFF?: NORMAL
MCHC RBC-ENTMCNC: 32 PG
MCHC RBC-ENTMCNC: 34 GM/DL
MCV RBC AUTO: 94.2 FL
MONOCYTES # BLD AUTO: 0.83 K/UL
MONOCYTES NFR BLD AUTO: 10.1 %
NEUTROPHILS # BLD AUTO: 5.12 K/UL
NEUTROPHILS NFR BLD AUTO: 62 %
NONHDLC SERPL-MCNC: 123 MG/DL
PLATELET # BLD AUTO: 124 K/UL
POTASSIUM SERPL-SCNC: 5 MMOL/L
PROT SERPL-MCNC: 7.5 G/DL
RBC # BLD: 4.47 M/UL
RBC # FLD: 13 %
SODIUM SERPL-SCNC: 139 MMOL/L
TRIGL SERPL-MCNC: 360 MG/DL
TSH SERPL-ACNC: 3.11 UIU/ML
VIT B12 SERPL-MCNC: 459 PG/ML
WBC # FLD AUTO: 8.25 K/UL

## 2021-03-29 NOTE — PHYSICAL THERAPY INITIAL EVALUATION ADULT - GAIT DEVIATIONS NOTED, PT EVAL
"Requested Prescriptions   Pending Prescriptions Disp Refills     warfarin ANTICOAGULANT (COUMADIN) 5 MG tablet [Pharmacy Med Name: WARFARIN SODIUM 5 MG TABLET] 90 tablet 1     Sig: TAKE 5 MG X 6 DAYS AND 2.5 MG X 1 DAY/WEEK OR AS DIRECTED BY THE [facility].       Vitamin K Antagonists Failed - 3/29/2021 12:10 AM        Failed - INR is within goal in the past 6 weeks     Confirm INR is within goal in the past 6 weeks.     Recent Labs   Lab Test 03/22/21   INR 2.4*                       Passed - Recent (12 mo) or future (30 days) visit within the authorizing provider's specialty     Patient has had an office visit with the authorizing provider or a provider within the authorizing providers department within the previous 12 mos or has a future within next 30 days. See \"Patient Info\" tab in inbasket, or \"Choose Columns\" in Meds & Orders section of the refill encounter.              Passed - Medication is active on med list        Passed - Patient is 18 years of age or older        Passed - Patient is not pregnant        Passed - No positive pregnancy on file in past 12 months           Prescription refilled per RN MedicationRefill Policy.................... Smitha Nair RN ....................  3/29/2021   2:24 PM      " decreased stride length/decreased step length/decreased eleanor

## 2021-05-07 NOTE — PROGRESS NOTE ADULT - PROBLEM SELECTOR PROBLEM 6
[de-identified] : 05/07/21:\par SR, NSST changes.
[de-identified] : 04/06/21:\par LVEF 65%, normal diastolic  function\par Mild MR, TR.
Weight loss

## 2021-06-24 ENCOUNTER — APPOINTMENT (OUTPATIENT)
Dept: CARDIOLOGY | Facility: CLINIC | Age: 61
End: 2021-06-24

## 2022-03-09 ENCOUNTER — RX RENEWAL (OUTPATIENT)
Age: 62
End: 2022-03-09

## 2022-03-09 ENCOUNTER — NON-APPOINTMENT (OUTPATIENT)
Age: 62
End: 2022-03-09

## 2022-03-10 ENCOUNTER — RX RENEWAL (OUTPATIENT)
Age: 62
End: 2022-03-10

## 2022-03-16 ENCOUNTER — NON-APPOINTMENT (OUTPATIENT)
Age: 62
End: 2022-03-16

## 2022-04-18 ENCOUNTER — RX RENEWAL (OUTPATIENT)
Age: 62
End: 2022-04-18

## 2022-04-19 ENCOUNTER — NON-APPOINTMENT (OUTPATIENT)
Age: 62
End: 2022-04-19

## 2022-06-08 ENCOUNTER — RX RENEWAL (OUTPATIENT)
Age: 62
End: 2022-06-08

## 2022-06-13 NOTE — H&P PST ADULT - NS SC CAGE ALCOHOL EYE OPENER
Preparation Of Recipient Site - Flap: The eschar was removed surgically with sharp dissection to facilitate appropriate wound healing of the following adjacent tissue rearrangement. no

## 2022-06-27 ENCOUNTER — APPOINTMENT (OUTPATIENT)
Dept: ORTHOPEDIC SURGERY | Facility: CLINIC | Age: 62
End: 2022-06-27
Payer: OTHER MISCELLANEOUS

## 2022-06-27 VITALS — BODY MASS INDEX: 32.98 KG/M2 | WEIGHT: 257 LBS | HEIGHT: 74 IN

## 2022-06-27 PROCEDURE — 99072 ADDL SUPL MATRL&STAF TM PHE: CPT

## 2022-06-27 PROCEDURE — 72040 X-RAY EXAM NECK SPINE 2-3 VW: CPT

## 2022-06-27 PROCEDURE — 72100 X-RAY EXAM L-S SPINE 2/3 VWS: CPT

## 2022-06-27 PROCEDURE — 99214 OFFICE O/P EST MOD 30 MIN: CPT

## 2022-06-27 RX ORDER — OFLOXACIN OTIC 3 MG/ML
0.3 SOLUTION AURICULAR (OTIC)
Qty: 5 | Refills: 0 | Status: ACTIVE | COMMUNITY
Start: 2022-06-24

## 2022-06-30 ENCOUNTER — FORM ENCOUNTER (OUTPATIENT)
Age: 62
End: 2022-06-30

## 2022-07-05 ENCOUNTER — FORM ENCOUNTER (OUTPATIENT)
Age: 62
End: 2022-07-05

## 2022-07-29 ENCOUNTER — RX RENEWAL (OUTPATIENT)
Age: 62
End: 2022-07-29

## 2022-08-02 ENCOUNTER — APPOINTMENT (OUTPATIENT)
Dept: CARDIOLOGY | Facility: CLINIC | Age: 62
End: 2022-08-02

## 2022-08-02 ENCOUNTER — NON-APPOINTMENT (OUTPATIENT)
Age: 62
End: 2022-08-02

## 2022-08-02 VITALS
WEIGHT: 211 LBS | HEIGHT: 74 IN | SYSTOLIC BLOOD PRESSURE: 133 MMHG | HEART RATE: 88 BPM | BODY MASS INDEX: 27.08 KG/M2 | OXYGEN SATURATION: 97 % | DIASTOLIC BLOOD PRESSURE: 74 MMHG

## 2022-08-02 DIAGNOSIS — M79.606 PAIN IN LEG, UNSPECIFIED: ICD-10-CM

## 2022-08-02 PROCEDURE — 93000 ELECTROCARDIOGRAM COMPLETE: CPT

## 2022-08-02 PROCEDURE — 99214 OFFICE O/P EST MOD 30 MIN: CPT | Mod: 25

## 2022-08-03 NOTE — HISTORY OF PRESENT ILLNESS
[FreeTextEntry1] : 62 year old man with a history of cauda equina syndrome, hyperlipidemia, early family history of CAD, coronary calcifications, smoker.\par He was sent to [place] for a cardiac CT by his primary cardiologist and was aborted secondary to calcium. Though he underwent a pharmacological nuclear stress test in our office on 1/25/19 that was not significant for ischemia. He was noted to have increased PVCs. \par \par He is  status post lumbar laminectomy. \par \par Since his last visit he has lost a significant amount of weight. He was told that his DM has been uncontrolled. He has not been able to exercise because his back pain. he is doing PT. he has intermittently left sharp chest pain that is nonradiaiting nonexertional self limiting. He denies any  PND, orthopnea, lower extremity edema, near syncope, syncope, strokelike symptoms. He is drinking about a 6 pack of beer.   He is compliant with his medications. \par

## 2022-08-03 NOTE — PHYSICAL EXAM
[Well Groomed] : well groomed [General Appearance - In No Acute Distress] : no acute distress [Normal Conjunctiva] : the conjunctiva exhibited no abnormalities [Eyelids - No Xanthelasma] : the eyelids demonstrated no xanthelasmas [Normal Oral Mucosa] : normal oral mucosa [No Oral Pallor] : no oral pallor [No Oral Cyanosis] : no oral cyanosis [Normal Jugular Venous A Waves Present] : normal jugular venous A waves present [Normal Jugular Venous V Waves Present] : normal jugular venous V waves present [No Jugular Venous Anguiano A Waves] : no jugular venous anguiano A waves [Respiration, Rhythm And Depth] : normal respiratory rhythm and effort [Exaggerated Use Of Accessory Muscles For Inspiration] : no accessory muscle use [Auscultation Breath Sounds / Voice Sounds] : lungs were clear to auscultation bilaterally [Abdomen Soft] : soft [Abdomen Tenderness] : non-tender [Abdomen Mass (___ Cm)] : no abdominal mass palpated [Abnormal Walk] : normal gait [Nail Clubbing] : no clubbing of the fingernails [Cyanosis, Localized] : no localized cyanosis [Petechial Hemorrhages (___cm)] : no petechial hemorrhages [] : no rash [Skin Color & Pigmentation] : normal skin color and pigmentation [No Venous Stasis] : no venous stasis [Skin Lesions] : no skin lesions [No Skin Ulcers] : no skin ulcer [No Xanthoma] : no  xanthoma was observed [Oriented To Time, Place, And Person] : oriented to person, place, and time [Affect] : the affect was normal [Mood] : the mood was normal [No Anxiety] : not feeling anxious [Normal Rate] : normal [Rhythm Regular] : regular [Normal S1] : normal S1 [Normal S2] : normal S2 [No Gallop] : no gallop heard [No Murmur] : no murmurs heard [2+] : left 2+ [No Pitting Edema] : no pitting edema present [Right Carotid Bruit] : no bruit heard over the right carotid [Left Carotid Bruit] : no bruit heard over the left carotid [Bruit] : no bruit heard

## 2022-08-03 NOTE — DISCUSSION/SUMMARY
[FreeTextEntry1] : 60 year man with a history as listed presents for a followup cardiac evaluation. \par \par Mark  is complaining of nonanginal chest pain. He had a  coronary CT in 12/2018 with significant calcification in all vessels.  He will get a pharmacological stress test and echo. \par \par Clinically he is euvolemic on exam. His EKG did not reveal any significant ischemic changes. \par \par His blood pressure is controlled from his back pain and weight gain.  Control  Toprol  100mg Q12 for his BP and PVCs. \par \par He will continue with statin therapy.  Given his new DM and elevated Triglycerides he continue vascepa. He will continue with statin therapy to achieve maintain goal LDL<100 or ideally <70. At your convenience, please fax me his latest lab results including lipid profile.\par \par Smoking cessation counseling was performed. \par He will followup with me in 4-6 months. \par  [EKG obtained to assist in diagnosis and management of assessed problem(s)] : EKG obtained to assist in diagnosis and management of assessed problem(s)

## 2022-08-05 ENCOUNTER — APPOINTMENT (OUTPATIENT)
Dept: CARDIOLOGY | Facility: CLINIC | Age: 62
End: 2022-08-05

## 2022-08-05 PROCEDURE — 93306 TTE W/DOPPLER COMPLETE: CPT

## 2022-08-05 PROCEDURE — 93925 LOWER EXTREMITY STUDY: CPT

## 2022-08-08 ENCOUNTER — NON-APPOINTMENT (OUTPATIENT)
Age: 62
End: 2022-08-08

## 2022-08-17 ENCOUNTER — APPOINTMENT (OUTPATIENT)
Dept: CARDIOLOGY | Facility: CLINIC | Age: 62
End: 2022-08-17

## 2022-08-17 PROCEDURE — 93015 CV STRESS TEST SUPVJ I&R: CPT

## 2022-08-17 PROCEDURE — A9500: CPT

## 2022-08-17 PROCEDURE — 78452 HT MUSCLE IMAGE SPECT MULT: CPT

## 2022-09-07 ENCOUNTER — APPOINTMENT (OUTPATIENT)
Dept: VASCULAR SURGERY | Facility: CLINIC | Age: 62
End: 2022-09-07

## 2022-09-07 ENCOUNTER — RX RENEWAL (OUTPATIENT)
Age: 62
End: 2022-09-07

## 2022-09-12 ENCOUNTER — APPOINTMENT (OUTPATIENT)
Dept: ORTHOPEDIC SURGERY | Facility: CLINIC | Age: 62
End: 2022-09-12

## 2022-09-12 VITALS — WEIGHT: 211 LBS | HEIGHT: 74 IN | BODY MASS INDEX: 27.08 KG/M2

## 2022-09-12 VITALS — HEIGHT: 74 IN

## 2022-09-12 DIAGNOSIS — M51.26 OTHER INTERVERTEBRAL DISC DISPLACEMENT, LUMBAR REGION: ICD-10-CM

## 2022-09-12 DIAGNOSIS — M48.07 SPINAL STENOSIS, LUMBOSACRAL REGION: ICD-10-CM

## 2022-09-12 PROCEDURE — 99072 ADDL SUPL MATRL&STAF TM PHE: CPT

## 2022-09-12 PROCEDURE — 99214 OFFICE O/P EST MOD 30 MIN: CPT

## 2022-09-29 ENCOUNTER — FORM ENCOUNTER (OUTPATIENT)
Age: 62
End: 2022-09-29

## 2022-12-29 NOTE — H&P PST ADULT - NS NEC GEN PE MLT EXAM PC
16500/1     Jadiel Jung MRN: [id number]  AGE: 58 year old  ADMIT DATE: 12/20/2022    CODE STATUS: Full Resuscitation  ISOLATION STATUS: No active isolations   DIET: Consistent Carb Moderate (45-75 Gm/meal), Sodium 2 Gm (low Sodium); Fluid Restrict 1500ml (900 From Dietary) Diet    ALLERGIES:  Patient has no known allergies.     DX:Ascites due to alcoholic cirrhosis (CMS/HCC)  (primary encounter diagnosis)  HUMPHREY (acute kidney injury) (CMS/HCC)  Essential (primary) hypertension  Hyponatremia     Att: Neal Lowe MD  PCP: No Pcp  IP Consult Orders (From admission, onward)     Start     Ordered    12/27/22 1105  Inpatient consult to Infectious Diseases  ONE TIME        Provider:  Bora Forte MD    12/27/22 1105    12/21/22 0805  Inpatient Consult to Interventional Radiology  ONE TIME        Provider:  Carolina Rosario CNP    12/21/22 0805    12/20/22 1924  Inpatient consult to Nephrology  ONE TIME        Provider:  Real Montero MD    12/20/22 1923 12/20/22 1924  Inpatient consult to GI  ONE TIME        Provider:  Khai Jay MD    12/20/22 1923                    BP: 111/74  Temp: 97.5 °F (36.4 °C)  Temp src: Oral  Heart Rate: 75  Resp: 18  SpO2: 100 %  Height: 5' 5\" (165.1 cm)  Weight:  (patient refused to be weighed, will pass on to AM shift to try again)   Weight change:      Recent Labs   Lab 12/28/22  1613 12/28/22 2024 12/29/22  0725 12/29/22  1112   GLUCOSE BEDSIDE 171* 174* 147* 181*        Creatinine (mg/dL)   Date Value   12/29/2022 1.18 (H)   12/28/2022 1.42 (H)     PTT (sec)   Date Value   12/20/2022 26     INR (no units)   Date Value   12/20/2022 1.2     WBC (K/mcL)   Date Value   12/29/2022 7.1   12/28/2022 13.6 (H)        I/O last 3 completed shifts:  In: 400 [P.O.:300; IV Piggyback:100]  Out: -                          IMPORTANT EVENTS THIS SHIFT:  Pt A&Ox3. SPN speaker. Family member came to  pt since daughter in law had left. I went over DC paperwork with pt and his family  member. All questions were answered. Pt was wheeled down to main entrance and taken home safely.    IMPORTANT EVENTS COMING UP/GOALS (PLEASE INCLUDE WHITE BOARD AND DISCHARGE BOARD UPDATES):       PATIENT SPECIAL NEEDS/ACCOMMODATIONS:                                 Problem: At Risk for Falls  Goal: # Patient does not fall  Outcome: Outcome Met, Complete Goal  Goal: # Takes action to control fall-related risks  Outcome: Outcome Met, Complete Goal  Goal: # Verbalizes understanding of fall risk/precautions  Description: Document education using the patient education activity  Outcome: Outcome Met, Complete Goal     Problem: At Risk for Injury Due to Fall  Goal: # Patient does not fall  Outcome: Outcome Met, Complete Goal  Goal: # Takes action to control condition specific risks  Outcome: Outcome Met, Complete Goal  Goal: # Verbalizes understanding of fall-related injury personal risks  Description: Document education using the patient education activity  Outcome: Outcome Met, Complete Goal     Problem: Diabetes  Goal: Glycemic balance achieved/maintained  Description: Goal is to maintain blood sugar within range with no episodes of hypoglycemia  Outcome: Outcome Met, Complete Goal  Goal: Verbalizes/demonstrates understanding of Diabetes  Description: Document on Patient Education Activity  Outcome: Outcome Met, Complete Goal     Problem: Fluid Volume Excess  Goal: # Fluid Volume Excess Symptoms Resolved  Description: Treatment often consists of oxygen and respiratory support with diuretic therapy at doses that exceed usual dose (typically doubled).  Monitor patient response to treatment.    Acute dyspnea should resolve quickly if dose is adequate and kidney function is adequate. Dyspnea/SOB should only be observed with Activity after effective treatment. Patient should be able to lie down comfortably, without SOB.  Outcome: Outcome Met, Complete Goal  Goal: # Oxygenation is maintained (SpO2 greater than or equal to  90% or as ordered)  Outcome: Outcome Met, Complete Goal  Goal: # Verbalizes understanding of FVE management plan  Description: Document on Patient Education Activity  Outcome: Outcome Met, Complete Goal     Problem: Non-Pressure Injury Wound  Goal: # No deterioration in wound  Outcome: Outcome Met, Complete Goal  Goal: # Verbalizes understanding of wound and wound care  Description: If abnormality is a skin tear, avoid using tape on skin including transparent dressings. Document education using the patient education activity.  Outcome: Outcome Met, Complete Goal  Goal: Participates in wound care activities  Outcome: Outcome Met, Complete Goal     Problem: VTE, Risk for  Goal: # No s/s of VTE  Outcome: Outcome Met, Complete Goal  Goal: # Verbalizes understanding of VTE risk factors and prevention  Description: Document education using the patient education activity.   Outcome: Outcome Met, Complete Goal  Goal: Demonstrates ability to administer injectable anticoagulants if ordered for d/c  Description: Document education using the patient education activity.  Outcome: Outcome Met, Complete Goal      detailed exam

## 2023-02-17 ENCOUNTER — APPOINTMENT (OUTPATIENT)
Dept: CARDIOLOGY | Facility: CLINIC | Age: 63
End: 2023-02-17

## 2023-03-22 ENCOUNTER — RX RENEWAL (OUTPATIENT)
Age: 63
End: 2023-03-22

## 2023-03-30 NOTE — H&P PST ADULT - PAIN RATING AT ACTIVITY
DENTAL RESTORATION  Procedure Note    Xochitl Andrade  3/30/2023    Pre-op Diagnosis:   DENTAL CARIES    Post-op Diagnosis:     Post-Op Diagnosis Codes:     * Healthy male adolescent [Z00.129]    Procedure/CPT® Codes:      Procedure(s):  TAKE RADIOGRAPHS, DENTAL TREATMENT TO REMOVE CARIES, REMOVAL OF INFECTION, SCALING, POLISHING, FLUORIDE TREATMENT, , EXTRACTION,  PLACEMENT OF STAINLESS STEEL CROWNS, WHITE FACING CROWNS, PLACEMENT OF COMPOSITES    Surgeon(s):  Wallace Munguia Jr., DMD    Anesthesia: General    Staff:   Circulator: Jazmin Chavez RN  Scrub Person: Cleopatra Hess        Estimated Blood Loss: minimal    Specimens:                none    INTRAOPERATIVE COMPLICATIONS:none'    INDICATIONS: caries, infection,anxiety     OPERATION:  6 pa's.  NuSmile was placed on C, D, E, F, G, H.  SSC's were placed on A, B, I, J, K, L, S.  Simple ext of TDanyelle Munguia Jr., DMD     Date: 3/30/2023  Time: 10:20 CDT               10

## 2023-04-24 ENCOUNTER — APPOINTMENT (OUTPATIENT)
Dept: ORTHOPEDIC SURGERY | Facility: CLINIC | Age: 63
End: 2023-04-24
Payer: OTHER MISCELLANEOUS

## 2023-04-24 VITALS — BODY MASS INDEX: 27.08 KG/M2 | HEIGHT: 74 IN | WEIGHT: 211 LBS

## 2023-04-24 DIAGNOSIS — M54.2 CERVICALGIA: ICD-10-CM

## 2023-04-24 DIAGNOSIS — M54.12 RADICULOPATHY, CERVICAL REGION: ICD-10-CM

## 2023-04-24 PROCEDURE — 72040 X-RAY EXAM NECK SPINE 2-3 VW: CPT

## 2023-04-24 PROCEDURE — 99214 OFFICE O/P EST MOD 30 MIN: CPT

## 2023-04-24 PROCEDURE — 72100 X-RAY EXAM L-S SPINE 2/3 VWS: CPT

## 2023-04-24 RX ORDER — OXYCODONE 5 MG/1
5 TABLET ORAL
Qty: 30 | Refills: 0 | Status: ACTIVE | COMMUNITY
Start: 2023-04-24 | End: 1900-01-01

## 2023-05-22 ENCOUNTER — APPOINTMENT (OUTPATIENT)
Dept: ORTHOPEDIC SURGERY | Facility: CLINIC | Age: 63
End: 2023-05-22
Payer: OTHER MISCELLANEOUS

## 2023-05-22 VITALS — HEIGHT: 74 IN | BODY MASS INDEX: 27.08 KG/M2 | WEIGHT: 211 LBS

## 2023-05-22 DIAGNOSIS — M51.36 OTHER INTERVERTEBRAL DISC DEGENERATION, LUMBAR REGION: ICD-10-CM

## 2023-05-22 DIAGNOSIS — M50.30 OTHER CERVICAL DISC DEGENERATION, UNSPECIFIED CERVICAL REGION: ICD-10-CM

## 2023-05-22 PROCEDURE — 99214 OFFICE O/P EST MOD 30 MIN: CPT

## 2023-05-22 RX ORDER — MELOXICAM 15 MG/1
15 TABLET ORAL
Qty: 30 | Refills: 1 | Status: ACTIVE | COMMUNITY
Start: 2023-05-22 | End: 1900-01-01

## 2023-07-03 ENCOUNTER — APPOINTMENT (OUTPATIENT)
Dept: ORTHOPEDIC SURGERY | Facility: CLINIC | Age: 63
End: 2023-07-03

## 2023-07-31 ENCOUNTER — RX RENEWAL (OUTPATIENT)
Age: 63
End: 2023-07-31

## 2023-10-09 ENCOUNTER — RX RENEWAL (OUTPATIENT)
Age: 63
End: 2023-10-09

## 2023-11-13 ENCOUNTER — RX RENEWAL (OUTPATIENT)
Age: 63
End: 2023-11-13

## 2024-01-25 NOTE — DISCHARGE NOTE ADULT - FUNCTIONAL SCREEN CURRENT LEVEL: DRESSING, MLM
Decadron 4mg escribed to pt's [facility] pharmacy.  Instructed to take 4mg daily with breakfast x 4 days.  She will start today.  Pt will get IV Decadron with chemo on Monday.  She will notify us if pain to t spine persists.    Message  Received: Today  Anna Johnson RN Farris, Khari Slater MD  Pt called.  RT T spine x 10 ended 1-15-24.  SRS brain x 1 1-17-24.  The last 3 days she is having intense pain to treated area.  Her pain had been getting better during RT.  It seems to start in the afternoon.  She is taking Motrin, Norco 7.5mg tid, Biofreeze and resting.  She starts chemo Monday.  She said Dr. Segura mentioned flair.  She has taken steroids and tolerates them fine.  Please advise.    Anna Johnson RN   (0) independent

## 2024-02-01 ENCOUNTER — RX RENEWAL (OUTPATIENT)
Age: 64
End: 2024-02-01

## 2024-02-01 RX ORDER — MELOXICAM 15 MG/1
15 TABLET ORAL
Qty: 30 | Refills: 1 | Status: ACTIVE | COMMUNITY
Start: 2023-04-24 | End: 1900-01-01

## 2024-02-05 ENCOUNTER — APPOINTMENT (OUTPATIENT)
Dept: ORTHOPEDIC SURGERY | Facility: CLINIC | Age: 64
End: 2024-02-05
Payer: OTHER MISCELLANEOUS

## 2024-02-05 VITALS — BODY MASS INDEX: 27.08 KG/M2 | HEIGHT: 74 IN | WEIGHT: 211 LBS

## 2024-02-05 PROCEDURE — 99214 OFFICE O/P EST MOD 30 MIN: CPT

## 2024-02-15 ENCOUNTER — RX RENEWAL (OUTPATIENT)
Age: 64
End: 2024-02-15

## 2024-04-05 ENCOUNTER — RX RENEWAL (OUTPATIENT)
Age: 64
End: 2024-04-05

## 2024-04-22 ENCOUNTER — APPOINTMENT (OUTPATIENT)
Dept: CARDIOLOGY | Facility: CLINIC | Age: 64
End: 2024-04-22
Payer: MEDICARE

## 2024-04-22 ENCOUNTER — NON-APPOINTMENT (OUTPATIENT)
Age: 64
End: 2024-04-22

## 2024-04-22 VITALS
HEIGHT: 74 IN | HEART RATE: 87 BPM | BODY MASS INDEX: 32.08 KG/M2 | DIASTOLIC BLOOD PRESSURE: 102 MMHG | WEIGHT: 250 LBS | OXYGEN SATURATION: 98 % | SYSTOLIC BLOOD PRESSURE: 162 MMHG

## 2024-04-22 VITALS — DIASTOLIC BLOOD PRESSURE: 90 MMHG | SYSTOLIC BLOOD PRESSURE: 140 MMHG

## 2024-04-22 PROCEDURE — 93000 ELECTROCARDIOGRAM COMPLETE: CPT

## 2024-04-22 PROCEDURE — 99214 OFFICE O/P EST MOD 30 MIN: CPT | Mod: 25

## 2024-04-22 RX ORDER — METOPROLOL SUCCINATE 100 MG/1
100 TABLET, EXTENDED RELEASE ORAL
Qty: 180 | Refills: 1 | Status: ACTIVE | COMMUNITY
Start: 2018-12-26 | End: 1900-01-01

## 2024-04-22 RX ORDER — ROSUVASTATIN CALCIUM 10 MG/1
10 TABLET, FILM COATED ORAL
Qty: 90 | Refills: 2 | Status: ACTIVE | COMMUNITY
Start: 2019-05-15 | End: 1900-01-01

## 2024-04-22 NOTE — DISCUSSION/SUMMARY
[FreeTextEntry1] : Mark is a 63-year man with a history as listed presents for a follow-up cardiac evaluation. He was compliant with his medications until he ran out a few months ago and stated he was having difficulty getting his prescriptions filled. Recently he has not been taking his metoprolol or Crestor. He also states he has been under a lot of stress lately. His initial blood pressure today was 162/102 and dropped to 140/90. These readings are without metoprolol for several months. He continues to smoke 1-2 cigarettes daily and states he is trying to quit. An echocardiogram performed on August 5, 2022 revealed posterior mitral annular calcification and calcified leaflets with normal diastolic opening.  There was no clear evidence of PRICILA or chordal PRICILA.  There was mild mitral regurgitation.  There were calcified trileaflet aortic valve with decreased opening.  There was a normal left atrium.  There was normal left ventricular internal dimensions and thicknesses.  There was normal left ventricular systolic function.  No segmental wall motion abnormalities were noted. A pharmacological nuclear stress study was performed on August 17, 2022 and revealed the patient had no symptoms of chest discomfort or shortness of breath.  There was a normal heart rate and blood pressure response.  There were no arrhythmias.  There was a medium size moderate defect in the inferior wall that is fixed and improved with prone imaging.  The defect is mostly likely related to diaphragmatic attenuation.  Clinically he is euvolemic on exam. His EKG did not reveal any significant ischemic changes.  He will restart statin therapy to achieve maintain goal LDL<100 or ideally <70.  In addition, he will resume metoprolol as ordered. I have asked him to return to the office for a blood pressure evaluation in two weeks. At that time, I will order blood work to be performed to evaluate the effectiveness of his Rosuvastatin. I have encouraged him to consult a dietician for meal planning for his diabetes.  Smoking cessation counseling was performed.  Further recommendations will be based on his clinical course.  [EKG obtained to assist in diagnosis and management of assessed problem(s)] : EKG obtained to assist in diagnosis and management of assessed problem(s)

## 2024-04-22 NOTE — CARDIOLOGY SUMMARY
[de-identified] : 8/2/22 SR 4/22/24 RSR @ [facility] [de-identified] : 1/2019 nuc: neg spect

## 2024-04-22 NOTE — REVIEW OF SYSTEMS
[Chest Discomfort] : chest discomfort [Negative] : Psychiatric [Joint Pain] : joint pain [FreeTextEntry9] : joint stiffness

## 2024-04-22 NOTE — HISTORY OF PRESENT ILLNESS
[FreeTextEntry1] : Mark is a 62 year old man with a history of cauda equina syndrome, hyperlipidemia, early family history of CAD, coronary calcifications, and a smoker. His history also includes going to [place] for a cardiac CT by his primary cardiologist and having it aborted secondary to calcium. Though he underwent a pharmacological nuclear stress test in our office on 1/25/19 that was not significant for ischemia. He was noted to have increased PVCs at that time.  He is status post lumbar laminectomy.   Since his last visit with Dr Clement which was on 8/2/22, he had lost a significant amount of weight. He was told that his DM has been uncontrolled and placed on insulin. He has not been able to exercise because his back pain. He denies any chest pain, ND, orthopnea, lower extremity edema, near syncope, syncope, strokelike symptoms. His last office visit with Dr Clement was on 8/2/22. He states he is trying to quit smoking and is smoking 1-2 cigarettes daily.  He has not been monitoring his blood sugars at home. He was compliant with his medications until he ran out a few months ago and stated he was having difficulty getting his prescriptions filled. Recently he has not been taking his metoprolol or Crestor. He also states he has been under a lot of stress lately. His initial blood pressure today was 162/102 and dropped to 140/90. These readings are without metoprolol for several months.

## 2024-04-22 NOTE — PHYSICAL EXAM
[Well Groomed] : well groomed [General Appearance - In No Acute Distress] : no acute distress [Normal Conjunctiva] : the conjunctiva exhibited no abnormalities [Eyelids - No Xanthelasma] : the eyelids demonstrated no xanthelasmas [Normal Oral Mucosa] : normal oral mucosa [No Oral Pallor] : no oral pallor [No Oral Cyanosis] : no oral cyanosis [Normal Jugular Venous A Waves Present] : normal jugular venous A waves present [Normal Jugular Venous V Waves Present] : normal jugular venous V waves present [No Jugular Venous Anguiano A Waves] : no jugular venous anguiano A waves [Respiration, Rhythm And Depth] : normal respiratory rhythm and effort [Exaggerated Use Of Accessory Muscles For Inspiration] : no accessory muscle use [Auscultation Breath Sounds / Voice Sounds] : lungs were clear to auscultation bilaterally [Abdomen Tenderness] : non-tender [Abdomen Soft] : soft [Abdomen Mass (___ Cm)] : no abdominal mass palpated [Abnormal Walk] : normal gait [Nail Clubbing] : no clubbing of the fingernails [Cyanosis, Localized] : no localized cyanosis [Petechial Hemorrhages (___cm)] : no petechial hemorrhages [Skin Color & Pigmentation] : normal skin color and pigmentation [] : no rash [No Venous Stasis] : no venous stasis [Skin Lesions] : no skin lesions [No Skin Ulcers] : no skin ulcer [No Xanthoma] : no  xanthoma was observed [Oriented To Time, Place, And Person] : oriented to person, place, and time [Affect] : the affect was normal [Mood] : the mood was normal [No Anxiety] : not feeling anxious [Normal Rate] : normal [Rhythm Regular] : regular [Normal S1] : normal S1 [Normal S2] : normal S2 [No Gallop] : no gallop heard [No Murmur] : no murmurs heard [2+] : left 2+ [No Pitting Edema] : no pitting edema present [Right Carotid Bruit] : no bruit heard over the right carotid [Left Carotid Bruit] : no bruit heard over the left carotid [Bruit] : no bruit heard

## 2024-05-03 ENCOUNTER — APPOINTMENT (OUTPATIENT)
Dept: CARDIOLOGY | Facility: CLINIC | Age: 64
End: 2024-05-03
Payer: MEDICARE

## 2024-05-03 ENCOUNTER — NON-APPOINTMENT (OUTPATIENT)
Age: 64
End: 2024-05-03

## 2024-05-03 VITALS
SYSTOLIC BLOOD PRESSURE: 152 MMHG | WEIGHT: 252 LBS | HEIGHT: 74 IN | OXYGEN SATURATION: 97 % | DIASTOLIC BLOOD PRESSURE: 90 MMHG | BODY MASS INDEX: 32.34 KG/M2 | HEART RATE: 73 BPM

## 2024-05-03 VITALS — SYSTOLIC BLOOD PRESSURE: 148 MMHG | DIASTOLIC BLOOD PRESSURE: 84 MMHG

## 2024-05-03 DIAGNOSIS — I10 ESSENTIAL (PRIMARY) HYPERTENSION: ICD-10-CM

## 2024-05-03 DIAGNOSIS — I25.10 ATHEROSCLEROTIC HEART DISEASE OF NATIVE CORONARY ARTERY W/OUT ANGINA PECTORIS: ICD-10-CM

## 2024-05-03 PROCEDURE — 93000 ELECTROCARDIOGRAM COMPLETE: CPT

## 2024-05-03 PROCEDURE — 99214 OFFICE O/P EST MOD 30 MIN: CPT | Mod: 25

## 2024-05-03 RX ORDER — AMLODIPINE BESYLATE 5 MG/1
5 TABLET ORAL DAILY
Qty: 90 | Refills: 3 | Status: ACTIVE | COMMUNITY
Start: 2024-05-03 | End: 1900-01-01

## 2024-05-03 NOTE — DISCUSSION/SUMMARY
[FreeTextEntry1] : Mark is a 63-year man with a history as listed presents for a follow-up blood pressure evaluation. He was compliant with his medications until he ran out a few months ago and stated he was having difficulty getting his prescriptions filled. Recently, since his last office visit, he has been taking his metoprolol and Crestor. He also states he has been under a lot of stress lately. His initial blood pressure today was 152/90 and dropped to 148/84. He continues to smoke 1-2 cigarettes daily and states he is trying to quit. An echocardiogram performed on August 5, 2022 revealed posterior mitral annular calcification and calcified leaflets with normal diastolic opening.  There was no clear evidence of PRICILA or chordal PRICILA.  There was mild mitral regurgitation.  There were calcified trileaflet aortic valve with decreased opening.  There was a normal left atrium.  There was normal left ventricular internal dimensions and thicknesses.  There was normal left ventricular systolic function.  No segmental wall motion abnormalities were noted. A pharmacological nuclear stress study was performed on August 17, 2022 and revealed the patient had no symptoms of chest discomfort or shortness of breath.  There was a normal heart rate and blood pressure response.  There were no arrhythmias.  There was a medium size moderate defect in the inferior wall that is fixed and improved with prone imaging.  The defect is mostly likely related to diaphragmatic attenuation.  Clinically he is euvolemic on exam. His EKG did not reveal any significant ischemic changes.  He will continue statin therapy to achieve maintain goal LDL<100 or ideally <70.  In addition, he will continue metoprolol as ordered. I have ordered amlodipine 5mg QD in addition to his other medications for better blood pressure control. I have asked him to return to the office for a blood pressure evaluation in three weeks. At that time, I will order blood work to be performed to evaluate the effectiveness of his Rosuvastatin. I have encouraged him to consult a dietician for meal planning for his diabetes. In addition, he will attempt to return to physical therapy through [facility].   Smoking cessation counseling was performed.  Further recommendations will be based on his clinical course.  [EKG obtained to assist in diagnosis and management of assessed problem(s)] : EKG obtained to assist in diagnosis and management of assessed problem(s)

## 2024-05-03 NOTE — CARDIOLOGY SUMMARY
[de-identified] : 8/2/22 SR 4/22/24 RSR @ [facility] 5/3/24 sinus LAHB, Nonspecific decreased voltage in V6. [de-identified] : 1/2019 nuc: neg spect

## 2024-05-03 NOTE — HISTORY OF PRESENT ILLNESS
[FreeTextEntry1] : Mark is a 62 year old man with a history of cauda equina syndrome, hyperlipidemia, early family history of CAD, coronary calcifications, and a smoker. He presents today for a follow up blood pressure evaluation. At his last office visit on 4/22/24, his blood pressure was initially 162/102 and dropped to 140/90. Today, his initial blood pressure was 152/90 and dropped to 148/84 during the exam. His history also includes going to [place] for a cardiac CT by his primary cardiologist and having it aborted secondary to calcium. Though he underwent a pharmacological nuclear stress test in our office on 1/25/19 that was not significant for ischemia. He was noted to have increased PVCs at that time.  He is status post lumbar laminectomy.   Since his last visit with Dr Clement which was on 8/2/22, he had lost a significant amount of weight and was down to 211 lbs in 2/24. Today he is 252lbs. He is looking to speak to a dietician to help him with his diet. Mark states he has been under a tremendous amount of emotional stress. He was told that his DM has been uncontrolled and placed on insulin. He has not been able to exercise because his back pain. He denies any chest pain, ND, orthopnea, lower extremity edema, near syncope, syncope, strokelike symptoms. His last office visit with Dr Clement was on 8/2/22. He states he is trying to quit smoking and is smoking 1-2 cigarettes daily.  He has not been monitoring his blood sugars at home. He was compliant with his medications until he ran out a few months ago and stated he was having difficulty getting his prescriptions filled. Recently he has not been taking his metoprolol or Crestor and restarted them after his last office visit on 4/22/24.   He is presently on metformin 500mg two tabs BID, metoprolol [id number] BID, Rosuvastatin 10mg QD, ASA 81mg QD and Vascepa 1 GM , two capsules BID.

## 2024-05-03 NOTE — REVIEW OF SYSTEMS
[Chest Discomfort] : chest discomfort [Joint Pain] : joint pain [Negative] : Heme/Lymph [FreeTextEntry9] : joint stiffness

## 2024-05-23 ENCOUNTER — APPOINTMENT (OUTPATIENT)
Dept: CARDIOLOGY | Facility: CLINIC | Age: 64
End: 2024-05-23

## 2024-07-05 ENCOUNTER — APPOINTMENT (OUTPATIENT)
Dept: CARDIOLOGY | Facility: CLINIC | Age: 64
End: 2024-07-05

## 2024-08-29 ENCOUNTER — APPOINTMENT (OUTPATIENT)
Dept: CARDIOLOGY | Facility: CLINIC | Age: 64
End: 2024-08-29

## 2025-01-29 NOTE — PRE-OP CHECKLIST - INTERNAL PROSTHESES
Recommend good supportive underwear.  I prefer a brief style underwear.  Avoid heavy lifting or straining for 3 weeks.  NSAIDs can be utilized to help this improve more quickly - can continue this for 7-10 days total   This should improve on its own.  Repeat Ultrasound in 3 months.   Please contact [facility] at [phone] (for CT/MRI/PET scan) or [phone] (for Ultrasound) to schedule your imaging study.  Follow up in 3 months.     yes(specify)/hardware in neck
